# Patient Record
Sex: MALE | Race: OTHER | NOT HISPANIC OR LATINO
[De-identification: names, ages, dates, MRNs, and addresses within clinical notes are randomized per-mention and may not be internally consistent; named-entity substitution may affect disease eponyms.]

---

## 2017-03-20 ENCOUNTER — APPOINTMENT (OUTPATIENT)
Dept: PULMONOLOGY | Facility: CLINIC | Age: 77
End: 2017-03-20

## 2017-03-20 VITALS
BODY MASS INDEX: 44.12 KG/M2 | HEIGHT: 63 IN | HEART RATE: 78 BPM | OXYGEN SATURATION: 84 % | DIASTOLIC BLOOD PRESSURE: 78 MMHG | SYSTOLIC BLOOD PRESSURE: 150 MMHG | RESPIRATION RATE: 18 BRPM | WEIGHT: 249 LBS

## 2017-04-24 ENCOUNTER — APPOINTMENT (OUTPATIENT)
Dept: PULMONOLOGY | Facility: CLINIC | Age: 77
End: 2017-04-24

## 2017-04-24 VITALS
BODY MASS INDEX: 44.65 KG/M2 | DIASTOLIC BLOOD PRESSURE: 72 MMHG | OXYGEN SATURATION: 82 % | HEIGHT: 63 IN | HEART RATE: 70 BPM | SYSTOLIC BLOOD PRESSURE: 148 MMHG | WEIGHT: 252 LBS | RESPIRATION RATE: 16 BRPM

## 2017-04-26 RX ORDER — METHYLPREDNISOLONE 4 MG/1
4 TABLET ORAL
Qty: 21 | Refills: 0 | Status: COMPLETED | COMMUNITY
Start: 2016-12-06

## 2017-07-03 ENCOUNTER — APPOINTMENT (OUTPATIENT)
Dept: PULMONOLOGY | Facility: CLINIC | Age: 77
End: 2017-07-03

## 2017-10-02 ENCOUNTER — APPOINTMENT (OUTPATIENT)
Dept: PULMONOLOGY | Facility: CLINIC | Age: 77
End: 2017-10-02
Payer: MEDICARE

## 2017-10-02 VITALS
DIASTOLIC BLOOD PRESSURE: 72 MMHG | BODY MASS INDEX: 43.23 KG/M2 | OXYGEN SATURATION: 93 % | HEIGHT: 63 IN | RESPIRATION RATE: 16 BRPM | HEART RATE: 64 BPM | SYSTOLIC BLOOD PRESSURE: 128 MMHG | WEIGHT: 244 LBS

## 2017-10-02 PROCEDURE — 94060 EVALUATION OF WHEEZING: CPT

## 2017-10-02 PROCEDURE — 94727 GAS DIL/WSHOT DETER LNG VOL: CPT

## 2017-10-02 PROCEDURE — 99214 OFFICE O/P EST MOD 30 MIN: CPT | Mod: 25

## 2017-10-02 PROCEDURE — 94729 DIFFUSING CAPACITY: CPT

## 2018-01-08 ENCOUNTER — APPOINTMENT (OUTPATIENT)
Dept: PULMONOLOGY | Facility: CLINIC | Age: 78
End: 2018-01-08
Payer: MEDICARE

## 2018-01-08 VITALS
SYSTOLIC BLOOD PRESSURE: 118 MMHG | BODY MASS INDEX: 42.52 KG/M2 | HEART RATE: 68 BPM | HEIGHT: 63 IN | DIASTOLIC BLOOD PRESSURE: 64 MMHG | OXYGEN SATURATION: 92 % | WEIGHT: 240 LBS | RESPIRATION RATE: 16 BRPM

## 2018-01-08 PROCEDURE — 99214 OFFICE O/P EST MOD 30 MIN: CPT

## 2018-02-01 ENCOUNTER — OUTPATIENT (OUTPATIENT)
Dept: OUTPATIENT SERVICES | Facility: HOSPITAL | Age: 78
LOS: 1 days | End: 2018-02-01
Payer: MEDICAID

## 2018-02-01 PROCEDURE — G9001: CPT

## 2018-02-26 ENCOUNTER — INPATIENT (INPATIENT)
Facility: HOSPITAL | Age: 78
LOS: 3 days | Discharge: HOME | DRG: 445 | End: 2018-03-02
Attending: SURGERY | Admitting: SURGERY
Payer: MEDICARE

## 2018-02-26 VITALS
RESPIRATION RATE: 18 BRPM | OXYGEN SATURATION: 89 % | TEMPERATURE: 98 F | DIASTOLIC BLOOD PRESSURE: 77 MMHG | HEART RATE: 70 BPM | SYSTOLIC BLOOD PRESSURE: 179 MMHG

## 2018-02-26 DIAGNOSIS — Z98.890 OTHER SPECIFIED POSTPROCEDURAL STATES: Chronic | ICD-10-CM

## 2018-02-26 DIAGNOSIS — R69 ILLNESS, UNSPECIFIED: ICD-10-CM

## 2018-02-26 DIAGNOSIS — I25.10 ATHEROSCLEROTIC HEART DISEASE OF NATIVE CORONARY ARTERY WITHOUT ANGINA PECTORIS: Chronic | ICD-10-CM

## 2018-02-26 LAB
ALBUMIN SERPL ELPH-MCNC: 3.8 G/DL — SIGNIFICANT CHANGE UP (ref 3.3–5)
ALBUMIN SERPL ELPH-MCNC: 3.8 G/DL — SIGNIFICANT CHANGE UP (ref 3.3–5)
ALP SERPL-CCNC: 80 U/L — SIGNIFICANT CHANGE UP (ref 40–120)
ALP SERPL-CCNC: 86 U/L — SIGNIFICANT CHANGE UP (ref 40–120)
ALT FLD-CCNC: 12 U/L — SIGNIFICANT CHANGE UP (ref 10–45)
ALT FLD-CCNC: 18 U/L — SIGNIFICANT CHANGE UP (ref 10–45)
ANION GAP SERPL CALC-SCNC: 11 MMOL/L — SIGNIFICANT CHANGE UP (ref 5–17)
ANION GAP SERPL CALC-SCNC: 13 MMOL/L — SIGNIFICANT CHANGE UP (ref 5–17)
APTT BLD: 27.8 SEC — SIGNIFICANT CHANGE UP (ref 27.5–37.4)
AST SERPL-CCNC: 14 U/L — SIGNIFICANT CHANGE UP (ref 10–40)
AST SERPL-CCNC: 50 U/L — HIGH (ref 10–40)
BASOPHILS NFR BLD AUTO: 0.3 % — SIGNIFICANT CHANGE UP (ref 0–2)
BILIRUB SERPL-MCNC: 0.3 MG/DL — SIGNIFICANT CHANGE UP (ref 0.2–1.2)
BILIRUB SERPL-MCNC: 0.4 MG/DL — SIGNIFICANT CHANGE UP (ref 0.2–1.2)
BUN SERPL-MCNC: 23 MG/DL — SIGNIFICANT CHANGE UP (ref 7–23)
BUN SERPL-MCNC: 24 MG/DL — HIGH (ref 7–23)
CALCIUM SERPL-MCNC: 8.5 MG/DL — SIGNIFICANT CHANGE UP (ref 8.4–10.5)
CALCIUM SERPL-MCNC: 8.8 MG/DL — SIGNIFICANT CHANGE UP (ref 8.4–10.5)
CHLORIDE SERPL-SCNC: 98 MMOL/L — SIGNIFICANT CHANGE UP (ref 96–108)
CHLORIDE SERPL-SCNC: 99 MMOL/L — SIGNIFICANT CHANGE UP (ref 96–108)
CO2 SERPL-SCNC: 28 MMOL/L — SIGNIFICANT CHANGE UP (ref 22–31)
CO2 SERPL-SCNC: 29 MMOL/L — SIGNIFICANT CHANGE UP (ref 22–31)
CREAT SERPL-MCNC: 0.68 MG/DL — SIGNIFICANT CHANGE UP (ref 0.5–1.3)
CREAT SERPL-MCNC: 0.69 MG/DL — SIGNIFICANT CHANGE UP (ref 0.5–1.3)
EOSINOPHIL NFR BLD AUTO: 0.8 % — SIGNIFICANT CHANGE UP (ref 0–6)
GLUCOSE SERPL-MCNC: 137 MG/DL — HIGH (ref 70–99)
GLUCOSE SERPL-MCNC: 179 MG/DL — HIGH (ref 70–99)
HCT VFR BLD CALC: 50.6 % — HIGH (ref 39–50)
HGB BLD-MCNC: 15.8 G/DL — SIGNIFICANT CHANGE UP (ref 13–17)
INR BLD: 1.02 — SIGNIFICANT CHANGE UP (ref 0.88–1.16)
LACTATE SERPL-SCNC: 1.7 MMOL/L — SIGNIFICANT CHANGE UP (ref 0.5–2)
LIDOCAIN IGE QN: 30 U/L — SIGNIFICANT CHANGE UP (ref 7–60)
LYMPHOCYTES # BLD AUTO: 8.4 % — LOW (ref 13–44)
MAGNESIUM SERPL-MCNC: 2 MG/DL — SIGNIFICANT CHANGE UP (ref 1.6–2.6)
MCHC RBC-ENTMCNC: 28.2 PG — SIGNIFICANT CHANGE UP (ref 27–34)
MCHC RBC-ENTMCNC: 31.2 G/DL — LOW (ref 32–36)
MCV RBC AUTO: 90.2 FL — SIGNIFICANT CHANGE UP (ref 80–100)
MONOCYTES NFR BLD AUTO: 4.8 % — SIGNIFICANT CHANGE UP (ref 2–14)
NEUTROPHILS NFR BLD AUTO: 85.7 % — HIGH (ref 43–77)
PLATELET # BLD AUTO: 293 K/UL — SIGNIFICANT CHANGE UP (ref 150–400)
POTASSIUM SERPL-MCNC: 5.1 MMOL/L — SIGNIFICANT CHANGE UP (ref 3.5–5.3)
POTASSIUM SERPL-MCNC: SIGNIFICANT CHANGE UP MMOL/L (ref 3.5–5.3)
POTASSIUM SERPL-SCNC: 5.1 MMOL/L — SIGNIFICANT CHANGE UP (ref 3.5–5.3)
POTASSIUM SERPL-SCNC: SIGNIFICANT CHANGE UP MMOL/L (ref 3.5–5.3)
PROT SERPL-MCNC: 8 G/DL — SIGNIFICANT CHANGE UP (ref 6–8.3)
PROT SERPL-MCNC: 8.4 G/DL — HIGH (ref 6–8.3)
PROTHROM AB SERPL-ACNC: 11.3 SEC — SIGNIFICANT CHANGE UP (ref 9.8–12.7)
RBC # BLD: 5.61 M/UL — SIGNIFICANT CHANGE UP (ref 4.2–5.8)
RBC # FLD: 15.1 % — SIGNIFICANT CHANGE UP (ref 10.3–16.9)
SODIUM SERPL-SCNC: 139 MMOL/L — SIGNIFICANT CHANGE UP (ref 135–145)
SODIUM SERPL-SCNC: 139 MMOL/L — SIGNIFICANT CHANGE UP (ref 135–145)
WBC # BLD: 14.4 K/UL — HIGH (ref 3.8–10.5)
WBC # FLD AUTO: 14.4 K/UL — HIGH (ref 3.8–10.5)

## 2018-02-26 PROCEDURE — 71045 X-RAY EXAM CHEST 1 VIEW: CPT | Mod: 26

## 2018-02-26 PROCEDURE — 76705 ECHO EXAM OF ABDOMEN: CPT | Mod: 26

## 2018-02-26 PROCEDURE — 99285 EMERGENCY DEPT VISIT HI MDM: CPT

## 2018-02-26 RX ORDER — ATORVASTATIN CALCIUM 80 MG/1
40 TABLET, FILM COATED ORAL AT BEDTIME
Refills: 0 | Status: DISCONTINUED | OUTPATIENT
Start: 2018-02-26 | End: 2018-03-02

## 2018-02-26 RX ORDER — FAMOTIDINE 10 MG/ML
20 INJECTION INTRAVENOUS ONCE
Refills: 0 | Status: COMPLETED | OUTPATIENT
Start: 2018-02-26 | End: 2018-02-26

## 2018-02-26 RX ORDER — HYDROMORPHONE HYDROCHLORIDE 2 MG/ML
0.5 INJECTION INTRAMUSCULAR; INTRAVENOUS; SUBCUTANEOUS EVERY 4 HOURS
Refills: 0 | Status: DISCONTINUED | OUTPATIENT
Start: 2018-02-26 | End: 2018-02-28

## 2018-02-26 RX ORDER — IPRATROPIUM/ALBUTEROL SULFATE 18-103MCG
3 AEROSOL WITH ADAPTER (GRAM) INHALATION EVERY 6 HOURS
Refills: 0 | Status: DISCONTINUED | OUTPATIENT
Start: 2018-02-26 | End: 2018-03-02

## 2018-02-26 RX ORDER — ONDANSETRON 8 MG/1
4 TABLET, FILM COATED ORAL ONCE
Refills: 0 | Status: COMPLETED | OUTPATIENT
Start: 2018-02-26 | End: 2018-02-26

## 2018-02-26 RX ORDER — PIPERACILLIN AND TAZOBACTAM 4; .5 G/20ML; G/20ML
3.38 INJECTION, POWDER, LYOPHILIZED, FOR SOLUTION INTRAVENOUS ONCE
Refills: 0 | Status: COMPLETED | OUTPATIENT
Start: 2018-02-26 | End: 2018-02-26

## 2018-02-26 RX ORDER — PIPERACILLIN AND TAZOBACTAM 4; .5 G/20ML; G/20ML
3.38 INJECTION, POWDER, LYOPHILIZED, FOR SOLUTION INTRAVENOUS EVERY 6 HOURS
Refills: 0 | Status: DISCONTINUED | OUTPATIENT
Start: 2018-02-26 | End: 2018-03-02

## 2018-02-26 RX ORDER — BUDESONIDE AND FORMOTEROL FUMARATE DIHYDRATE 160; 4.5 UG/1; UG/1
2 AEROSOL RESPIRATORY (INHALATION)
Refills: 0 | Status: DISCONTINUED | OUTPATIENT
Start: 2018-02-26 | End: 2018-03-02

## 2018-02-26 RX ORDER — ASPIRIN/CALCIUM CARB/MAGNESIUM 324 MG
81 TABLET ORAL DAILY
Refills: 0 | Status: DISCONTINUED | OUTPATIENT
Start: 2018-02-26 | End: 2018-03-02

## 2018-02-26 RX ORDER — PANTOPRAZOLE SODIUM 20 MG/1
40 TABLET, DELAYED RELEASE ORAL
Refills: 0 | Status: DISCONTINUED | OUTPATIENT
Start: 2018-02-26 | End: 2018-03-02

## 2018-02-26 RX ORDER — SODIUM CHLORIDE 9 MG/ML
1000 INJECTION, SOLUTION INTRAVENOUS
Refills: 0 | Status: DISCONTINUED | OUTPATIENT
Start: 2018-02-26 | End: 2018-02-28

## 2018-02-26 RX ORDER — TAMSULOSIN HYDROCHLORIDE 0.4 MG/1
0.4 CAPSULE ORAL AT BEDTIME
Refills: 0 | Status: DISCONTINUED | OUTPATIENT
Start: 2018-02-26 | End: 2018-03-02

## 2018-02-26 RX ORDER — LOSARTAN POTASSIUM 100 MG/1
100 TABLET, FILM COATED ORAL DAILY
Refills: 0 | Status: DISCONTINUED | OUTPATIENT
Start: 2018-02-26 | End: 2018-03-02

## 2018-02-26 RX ORDER — HEPARIN SODIUM 5000 [USP'U]/ML
7500 INJECTION INTRAVENOUS; SUBCUTANEOUS EVERY 8 HOURS
Refills: 0 | Status: DISCONTINUED | OUTPATIENT
Start: 2018-02-26 | End: 2018-03-02

## 2018-02-26 RX ORDER — SODIUM CHLORIDE 9 MG/ML
1000 INJECTION INTRAMUSCULAR; INTRAVENOUS; SUBCUTANEOUS ONCE
Refills: 0 | Status: COMPLETED | OUTPATIENT
Start: 2018-02-26 | End: 2018-02-26

## 2018-02-26 RX ORDER — KETOROLAC TROMETHAMINE 30 MG/ML
15 SYRINGE (ML) INJECTION ONCE
Refills: 0 | Status: DISCONTINUED | OUTPATIENT
Start: 2018-02-26 | End: 2018-02-26

## 2018-02-26 RX ORDER — HYDROMORPHONE HYDROCHLORIDE 2 MG/ML
1 INJECTION INTRAMUSCULAR; INTRAVENOUS; SUBCUTANEOUS EVERY 4 HOURS
Refills: 0 | Status: DISCONTINUED | OUTPATIENT
Start: 2018-02-26 | End: 2018-02-28

## 2018-02-26 RX ORDER — ONDANSETRON 8 MG/1
4 TABLET, FILM COATED ORAL EVERY 6 HOURS
Refills: 0 | Status: DISCONTINUED | OUTPATIENT
Start: 2018-02-26 | End: 2018-03-02

## 2018-02-26 RX ADMIN — LOSARTAN POTASSIUM 100 MILLIGRAM(S): 100 TABLET, FILM COATED ORAL at 19:11

## 2018-02-26 RX ADMIN — HEPARIN SODIUM 7500 UNIT(S): 5000 INJECTION INTRAVENOUS; SUBCUTANEOUS at 15:08

## 2018-02-26 RX ADMIN — HYDROMORPHONE HYDROCHLORIDE 0.5 MILLIGRAM(S): 2 INJECTION INTRAMUSCULAR; INTRAVENOUS; SUBCUTANEOUS at 23:30

## 2018-02-26 RX ADMIN — PIPERACILLIN AND TAZOBACTAM 200 GRAM(S): 4; .5 INJECTION, POWDER, LYOPHILIZED, FOR SOLUTION INTRAVENOUS at 13:28

## 2018-02-26 RX ADMIN — Medication 15 MILLIGRAM(S): at 05:40

## 2018-02-26 RX ADMIN — PIPERACILLIN AND TAZOBACTAM 200 GRAM(S): 4; .5 INJECTION, POWDER, LYOPHILIZED, FOR SOLUTION INTRAVENOUS at 19:10

## 2018-02-26 RX ADMIN — HYDROMORPHONE HYDROCHLORIDE 0.5 MILLIGRAM(S): 2 INJECTION INTRAMUSCULAR; INTRAVENOUS; SUBCUTANEOUS at 23:04

## 2018-02-26 RX ADMIN — ATORVASTATIN CALCIUM 40 MILLIGRAM(S): 80 TABLET, FILM COATED ORAL at 22:20

## 2018-02-26 RX ADMIN — PIPERACILLIN AND TAZOBACTAM 200 GRAM(S): 4; .5 INJECTION, POWDER, LYOPHILIZED, FOR SOLUTION INTRAVENOUS at 07:11

## 2018-02-26 RX ADMIN — Medication 81 MILLIGRAM(S): at 19:11

## 2018-02-26 RX ADMIN — FAMOTIDINE 20 MILLIGRAM(S): 10 INJECTION INTRAVENOUS at 05:40

## 2018-02-26 RX ADMIN — BUDESONIDE AND FORMOTEROL FUMARATE DIHYDRATE 2 PUFF(S): 160; 4.5 AEROSOL RESPIRATORY (INHALATION) at 19:11

## 2018-02-26 RX ADMIN — TAMSULOSIN HYDROCHLORIDE 0.4 MILLIGRAM(S): 0.4 CAPSULE ORAL at 22:19

## 2018-02-26 RX ADMIN — Medication 3 MILLILITER(S): at 23:04

## 2018-02-26 RX ADMIN — HEPARIN SODIUM 7500 UNIT(S): 5000 INJECTION INTRAVENOUS; SUBCUTANEOUS at 22:20

## 2018-02-26 RX ADMIN — Medication 15 MILLIGRAM(S): at 06:56

## 2018-02-26 RX ADMIN — SODIUM CHLORIDE 150 MILLILITER(S): 9 INJECTION, SOLUTION INTRAVENOUS at 22:19

## 2018-02-26 RX ADMIN — ONDANSETRON 4 MILLIGRAM(S): 8 TABLET, FILM COATED ORAL at 05:40

## 2018-02-26 RX ADMIN — SODIUM CHLORIDE 2000 MILLILITER(S): 9 INJECTION INTRAMUSCULAR; INTRAVENOUS; SUBCUTANEOUS at 05:40

## 2018-02-26 NOTE — PATIENT PROFILE ADULT. - VISION (WITH CORRECTIVE LENSES IF THE PATIENT USUALLY WEARS THEM):
weasr glasses/Partially impaired: cannot see medication labels or newsprint, but can see obstacles in path, and the surrounding layout; can count fingers at arm's length

## 2018-02-26 NOTE — H&P ADULT - HISTORY OF PRESENT ILLNESS
This is a 76 yo M with COPD (on 2L oxygen), SOURAV (on CPAP), HTN, CAD (s/p 3 stents and balloon angioplasty), BPH, p/w RUQ pain for 10 hours. The pain was described as 7/10 at RUQ and radiated to the back. +nausea, no vomiting, no chills/fever, not associated with meals. No SOB/chest pain or dizziness.     Never have colonoscopy before  Last meal was yesterday dinner  last plavix yesterday morning

## 2018-02-26 NOTE — ED PROVIDER NOTE - PHYSICAL EXAMINATION
VITAL SIGNS: I have reviewed nursing notes and confirm.  CONSTITUTIONAL: Well-developed; well-nourished obese male sitting in stretcher, comfortable and talkative, A&Ox3; in no acute distress.  SKIN: Agree with RN documentation regarding decubitus evaluation. Remainder of skin exam is warm and dry, no acute rash.  HEAD: Normocephalic; atraumatic.  EYES: PERRL, EOM intact; conjunctiva and sclera clear.  ENT: No nasal discharge; airway clear.  NECK: Supple; non tender.  CARD: S1, S2 normal; no murmurs, gallops, or rubs. RRR  RESP: + end exp wheeze all lung fields w/moderate excursion, no tachypnea or increased work of breathing  ABD: Normal bowel sounds; soft; obese though ND, + mild RUQ TTP w/out rigidity/guarding, no mcburney's pt ttp  EXT: Normal ROM. No clubbing, cyanosis or edema.  LYMPH: No acute cervical adenopathy.  NEURO: Alert, oriented. Grossly unremarkable.  PSYCH: Cooperative, appropriate.

## 2018-02-26 NOTE — H&P ADULT - NSHPPHYSICALEXAM_GEN_ALL_CORE
General: NAD, resting comfortably in bed  C/V: NSR  Pulm: Nonlabored breathing, no respiratory distress  Abd: soft, Non tender, mir negative, distended.  Extrem: WWP, no edema, SCDs in place      Vital Signs Last 24 Hrs  T(C): 36.4 (26 Feb 2018 06:02), Max: 36.4 (26 Feb 2018 05:18)  T(F): 97.5 (26 Feb 2018 06:02), Max: 97.6 (26 Feb 2018 05:18)  HR: 65 (26 Feb 2018 07:38) (62 - 70)  BP: 143/67 (26 Feb 2018 07:38) (143/67 - 179/77)  BP(mean): --  RR: 18 (26 Feb 2018 07:38) (18 - 18)  SpO2: 92% (26 Feb 2018 07:38) (89% - 92%)  I&O's Detail

## 2018-02-26 NOTE — ED PROVIDER NOTE - OBJECTIVE STATEMENT
76 y/o M w/CAD w/several stents s/p CABG on ASA/anti-platlet, HTN, HLD, COPD w/PRN O2 requirement, SOURAV on nightly CPAP, obesity, nephrolithiasis w/7x lithotripsy in the past, with known gallstones, following with Dr. Davis for biliary colic p/w acute onset RUQ abd pain which woke him from sleep tonight radiating around the right side to the back with nausea, no vomiting. No SOB/CP/palpitations. Passing gas, normal BM yesterday. No urinary sx. No fever/chills.

## 2018-02-26 NOTE — ED PROVIDER NOTE - PROGRESS NOTE DETAILS
Pt received from Dr. Salazar at s/o; pt with multiple co-morbidities, known gallstones, here w/ ruq pain, nausea, typical of his biliary colic. WBC mildly elev to 14, lactate, lft's, and lipase wnl. Pt given zosyn, and is awaiting ruq sono. Surgery aware and will evaluate. Pt comfortable appearing, will continue to monitor. RUQ neg for cholecystitis. Pt seen by surgery- to be admitted to Dr. [facility] [place], for monitoring and serial abd exams.

## 2018-02-26 NOTE — ED ADULT NURSE NOTE - OBJECTIVE STATEMENT
pain starts at 2330H, 25-Feb-2018. pain starts at 2330H, 25-Feb-2018.  pt is taking O2 and CPAP at home whenever necessary due to COPD    s/p cardiac stent

## 2018-02-26 NOTE — H&P ADULT - ASSESSMENT
76 yo M with COPD on home O2, CAD s/p 3 stents, BPH, HTN p/w RUQ pain, benign physical exam, leukocytosis of 14, US neg for acute cholecystitis, +cholelithiasis with hepatic statosis    Plan  Admit to regional under , team 4  NPO/IVF LR@150cc/hr  IV Zosyn 3.375mg Q8  Pain and nausea control  DVT prophylaxis  Not for surgical intervention yet  AM labs - CMP    plan d/w chief, pending with Attending

## 2018-02-26 NOTE — ED ADULT NURSE REASSESSMENT NOTE - NS ED NURSE REASSESS COMMENT FT1
received report from Josemanuel NAYLOR from nights, received pt awake, alert and oriented x 3.  pt on 2 liters nasal cannula respirations easy,unlabored.   Transport at bedside to take pt to Ultrasound.  Will continue to monitor.
chem lab informed that sample is hemolysed for potassium therefore draw another specimen and sent.

## 2018-02-26 NOTE — ED ADULT NURSE NOTE - PMH
Arthritis    BPH (benign prostatic hyperplasia)    CAD (coronary artery disease)    Cholelithiasis    COPD (chronic obstructive pulmonary disease)    HLD (hyperlipidemia)

## 2018-02-26 NOTE — ED PROVIDER NOTE - MEDICAL DECISION MAKING DETAILS
Likely biliary colic vs cholecystitis vs pancreatitis vs GERD/gastritis/PUD -- pending RUQ u/s, abdominal labs, ekg, sx management and reassessment. Will speak with surgery to ran as pt directed to report to ED by Susan should RUQ pain return.

## 2018-02-27 DIAGNOSIS — E78.00 PURE HYPERCHOLESTEROLEMIA, UNSPECIFIED: ICD-10-CM

## 2018-02-27 DIAGNOSIS — R09.02 HYPOXEMIA: ICD-10-CM

## 2018-02-27 DIAGNOSIS — I25.10 ATHEROSCLEROTIC HEART DISEASE OF NATIVE CORONARY ARTERY WITHOUT ANGINA PECTORIS: ICD-10-CM

## 2018-02-27 DIAGNOSIS — J44.9 CHRONIC OBSTRUCTIVE PULMONARY DISEASE, UNSPECIFIED: ICD-10-CM

## 2018-02-27 DIAGNOSIS — G47.33 OBSTRUCTIVE SLEEP APNEA (ADULT) (PEDIATRIC): ICD-10-CM

## 2018-02-27 LAB
ALBUMIN SERPL ELPH-MCNC: 3.6 G/DL — SIGNIFICANT CHANGE UP (ref 3.3–5)
ALP SERPL-CCNC: 75 U/L — SIGNIFICANT CHANGE UP (ref 40–120)
ALT FLD-CCNC: 34 U/L — SIGNIFICANT CHANGE UP (ref 10–45)
ANION GAP SERPL CALC-SCNC: 9 MMOL/L — SIGNIFICANT CHANGE UP (ref 5–17)
AST SERPL-CCNC: 29 U/L — SIGNIFICANT CHANGE UP (ref 10–40)
BILIRUB SERPL-MCNC: 0.7 MG/DL — SIGNIFICANT CHANGE UP (ref 0.2–1.2)
BUN SERPL-MCNC: 18 MG/DL — SIGNIFICANT CHANGE UP (ref 7–23)
CALCIUM SERPL-MCNC: 8.7 MG/DL — SIGNIFICANT CHANGE UP (ref 8.4–10.5)
CHLORIDE SERPL-SCNC: 99 MMOL/L — SIGNIFICANT CHANGE UP (ref 96–108)
CO2 SERPL-SCNC: 31 MMOL/L — SIGNIFICANT CHANGE UP (ref 22–31)
CREAT SERPL-MCNC: 0.66 MG/DL — SIGNIFICANT CHANGE UP (ref 0.5–1.3)
GLUCOSE SERPL-MCNC: 137 MG/DL — HIGH (ref 70–99)
GRAM STN FLD: SIGNIFICANT CHANGE UP
HCT VFR BLD CALC: 48.5 % — SIGNIFICANT CHANGE UP (ref 39–50)
HGB BLD-MCNC: 14.9 G/DL — SIGNIFICANT CHANGE UP (ref 13–17)
LIDOCAIN IGE QN: 28 U/L — SIGNIFICANT CHANGE UP (ref 7–60)
MAGNESIUM SERPL-MCNC: 1.9 MG/DL — SIGNIFICANT CHANGE UP (ref 1.6–2.6)
MCHC RBC-ENTMCNC: 28.1 PG — SIGNIFICANT CHANGE UP (ref 27–34)
MCHC RBC-ENTMCNC: 30.7 G/DL — LOW (ref 32–36)
MCV RBC AUTO: 91.5 FL — SIGNIFICANT CHANGE UP (ref 80–100)
PHOSPHATE SERPL-MCNC: 3 MG/DL — SIGNIFICANT CHANGE UP (ref 2.5–4.5)
PLATELET # BLD AUTO: 256 K/UL — SIGNIFICANT CHANGE UP (ref 150–400)
POTASSIUM SERPL-MCNC: 4.1 MMOL/L — SIGNIFICANT CHANGE UP (ref 3.5–5.3)
POTASSIUM SERPL-SCNC: 4.1 MMOL/L — SIGNIFICANT CHANGE UP (ref 3.5–5.3)
PROT SERPL-MCNC: 7.2 G/DL — SIGNIFICANT CHANGE UP (ref 6–8.3)
RBC # BLD: 5.3 M/UL — SIGNIFICANT CHANGE UP (ref 4.2–5.8)
RBC # FLD: 14.9 % — SIGNIFICANT CHANGE UP (ref 10.3–16.9)
SODIUM SERPL-SCNC: 139 MMOL/L — SIGNIFICANT CHANGE UP (ref 135–145)
SPECIMEN SOURCE: SIGNIFICANT CHANGE UP
WBC # BLD: 14.7 K/UL — HIGH (ref 3.8–10.5)
WBC # FLD AUTO: 14.7 K/UL — HIGH (ref 3.8–10.5)

## 2018-02-27 PROCEDURE — 99152 MOD SED SAME PHYS/QHP 5/>YRS: CPT

## 2018-02-27 PROCEDURE — 99223 1ST HOSP IP/OBS HIGH 75: CPT | Mod: GC

## 2018-02-27 PROCEDURE — 47490 INCISION OF GALLBLADDER: CPT

## 2018-02-27 PROCEDURE — 78226 HEPATOBILIARY SYSTEM IMAGING: CPT | Mod: 26

## 2018-02-27 PROCEDURE — 99222 1ST HOSP IP/OBS MODERATE 55: CPT

## 2018-02-27 RX ADMIN — PANTOPRAZOLE SODIUM 40 MILLIGRAM(S): 20 TABLET, DELAYED RELEASE ORAL at 07:17

## 2018-02-27 RX ADMIN — Medication 81 MILLIGRAM(S): at 11:58

## 2018-02-27 RX ADMIN — HEPARIN SODIUM 7500 UNIT(S): 5000 INJECTION INTRAVENOUS; SUBCUTANEOUS at 13:00

## 2018-02-27 RX ADMIN — PIPERACILLIN AND TAZOBACTAM 200 GRAM(S): 4; .5 INJECTION, POWDER, LYOPHILIZED, FOR SOLUTION INTRAVENOUS at 07:17

## 2018-02-27 RX ADMIN — SODIUM CHLORIDE 150 MILLILITER(S): 9 INJECTION, SOLUTION INTRAVENOUS at 23:30

## 2018-02-27 RX ADMIN — HEPARIN SODIUM 7500 UNIT(S): 5000 INJECTION INTRAVENOUS; SUBCUTANEOUS at 05:31

## 2018-02-27 RX ADMIN — PIPERACILLIN AND TAZOBACTAM 200 GRAM(S): 4; .5 INJECTION, POWDER, LYOPHILIZED, FOR SOLUTION INTRAVENOUS at 18:36

## 2018-02-27 RX ADMIN — PIPERACILLIN AND TAZOBACTAM 200 GRAM(S): 4; .5 INJECTION, POWDER, LYOPHILIZED, FOR SOLUTION INTRAVENOUS at 01:00

## 2018-02-27 RX ADMIN — PIPERACILLIN AND TAZOBACTAM 200 GRAM(S): 4; .5 INJECTION, POWDER, LYOPHILIZED, FOR SOLUTION INTRAVENOUS at 23:53

## 2018-02-27 RX ADMIN — LOSARTAN POTASSIUM 100 MILLIGRAM(S): 100 TABLET, FILM COATED ORAL at 05:30

## 2018-02-27 RX ADMIN — Medication 3 MILLILITER(S): at 23:54

## 2018-02-27 RX ADMIN — Medication 3 MILLILITER(S): at 11:58

## 2018-02-27 RX ADMIN — PIPERACILLIN AND TAZOBACTAM 200 GRAM(S): 4; .5 INJECTION, POWDER, LYOPHILIZED, FOR SOLUTION INTRAVENOUS at 12:25

## 2018-02-27 RX ADMIN — BUDESONIDE AND FORMOTEROL FUMARATE DIHYDRATE 2 PUFF(S): 160; 4.5 AEROSOL RESPIRATORY (INHALATION) at 18:38

## 2018-02-27 RX ADMIN — BUDESONIDE AND FORMOTEROL FUMARATE DIHYDRATE 2 PUFF(S): 160; 4.5 AEROSOL RESPIRATORY (INHALATION) at 05:31

## 2018-02-27 RX ADMIN — Medication 3 MILLILITER(S): at 05:42

## 2018-02-27 RX ADMIN — Medication 3 MILLILITER(S): at 18:38

## 2018-02-27 RX ADMIN — HYDROMORPHONE HYDROCHLORIDE 0.5 MILLIGRAM(S): 2 INJECTION INTRAMUSCULAR; INTRAVENOUS; SUBCUTANEOUS at 23:30

## 2018-02-27 RX ADMIN — HYDROMORPHONE HYDROCHLORIDE 0.5 MILLIGRAM(S): 2 INJECTION INTRAMUSCULAR; INTRAVENOUS; SUBCUTANEOUS at 23:59

## 2018-02-27 RX ADMIN — HEPARIN SODIUM 7500 UNIT(S): 5000 INJECTION INTRAVENOUS; SUBCUTANEOUS at 21:19

## 2018-02-27 NOTE — CONSULT NOTE ADULT - PROBLEM SELECTOR PROBLEM 1
Coronary artery disease involving native coronary artery of native heart without angina pectoris
Chronic obstructive pulmonary disease, unspecified COPD type

## 2018-02-27 NOTE — CONSULT NOTE ADULT - PROBLEM SELECTOR RECOMMENDATION 2
I informed him the need to be compliant with the cpap and full face mask.  He dose not remember the pressure setting he is on at home.  He is on cpap 10 and will observe.  Avoid over sedation.  Transfer the patient to monitor bed.
wheezing, needs bronchodilators and steroid inhalers

## 2018-02-27 NOTE — CONSULT NOTE ADULT - ATTENDING COMMENTS
Patient seen and examined with house-staff during bedside rounds.  Resident note read, including vitals, physical findings, laboratory data, and radiological reports.   Revisions included below.  Direct personal management at bed side and extensive interpretation of the data.  Plan was outlined and discussed in details with the housestaff.  Decision making of high complexity  I discussed with Dr Davis and family.  The resident is aware with the need to transfer the patient to monitor bed

## 2018-02-27 NOTE — CONSULT NOTE ADULT - PROBLEM SELECTOR RECOMMENDATION 9
the patient is unable to use the Symbicort.  He is to continue the nebulizer treatment and will follow on PIF in am.  He is follow by pulmonary and is on Spiriva and symbicort.  He may benefit from respimat
Noe sents, continue ASA and statin

## 2018-02-27 NOTE — CONSULT NOTE ADULT - PROBLEM SELECTOR RECOMMENDATION 3
continue statn
related to copd /karley/ and atelectasis from splinting from abdominal pain.  He is on home oxygen.  Continue oxygen supplementation and accept sat 88%.  Incease activity

## 2018-02-27 NOTE — PROGRESS NOTE ADULT - SUBJECTIVE AND OBJECTIVE BOX
ON: Complaints of back pain.  2/26: IV zosyn, no Sx intervention             76 yo M with COPD (on 2L oxygen), SOURAV (on CPAP), HTN, CAD (s/p 3 stents and balloon angioplasty), BPH, p/w RUQ pain for 10 hours    - Pain/ Nausea control PRN  - home meds  - IVF  - CLD  - Zosyn  - sqh, scd, oob ON: Complaints of back pain.  2/26: IV zosyn, no Sx intervention       SUBJECTIVE: Denies any complaints  Flatus: [x ] YES [ ] NO             Bowel Movement: [x ] YES [ ] NO  Pain (0-10):            Pain Control Adequate: [x ] YES [ ] NO  Nausea: [ ] YES [x ] NO            Vomiting: [ ] YES [x ] NO  Diarrhea: [ ] YES [x ] NO         Constipation: [ ] YES [x ] NO     Chest Pain: [ ] YES [x ] NO    SOB:  [ ] YES [x ] NO    MEDICATIONS  (STANDING):  ALBUTerol/ipratropium for Nebulization 3 milliLiter(s) Nebulizer every 6 hours  aspirin  chewable 81 milliGRAM(s) Oral daily  atorvastatin 40 milliGRAM(s) Oral at bedtime  buDESOnide 160 MICROgram(s)/formoterol 4.5 MICROgram(s) Inhaler 2 Puff(s) Inhalation two times a day  heparin  Injectable 7500 Unit(s) SubCutaneous every 8 hours  lactated ringers. 1000 milliLiter(s) (150 mL/Hr) IV Continuous <Continuous>  losartan 100 milliGRAM(s) Oral daily  pantoprazole    Tablet 40 milliGRAM(s) Oral before breakfast  piperacillin/tazobactam IVPB. 3.375 Gram(s) IV Intermittent every 6 hours  tamsulosin 0.4 milliGRAM(s) Oral at bedtime    MEDICATIONS  (PRN):  HYDROmorphone  Injectable 0.5 milliGRAM(s) IV Push every 4 hours PRN Moderate Pain  HYDROmorphone  Injectable 1 milliGRAM(s) IV Push every 4 hours PRN Severe Pain  ondansetron Injectable 4 milliGRAM(s) IV Push every 6 hours PRN Nausea      Vital Signs Last 24 Hrs  T(C): 35.8 (27 Feb 2018 05:46), Max: 36.9 (26 Feb 2018 11:08)  T(F): 96.4 (27 Feb 2018 05:46), Max: 98.5 (26 Feb 2018 11:08)  HR: 74 (27 Feb 2018 05:46) (60 - 75)  BP: 167/74 (27 Feb 2018 05:46) (139/74 - 167/74)  BP(mean): --  RR: 18 (27 Feb 2018 06:35) (17 - 24)  SpO2: 92% (27 Feb 2018 06:35) (82% - 98%)    PHYSICAL EXAM:      Constitutional: A&Ox3    Respiratory: non labored breathing, no respiratory distress    Cardiovascular: NSR, RRR    Gastrointestinal: Soft ND, tender to palp in RUQ             Genitourinary: VOIDING    Extremities: (-) edema                  I&O's Detail    26 Feb 2018 07:01  -  27 Feb 2018 07:00  --------------------------------------------------------  IN:    IV PiggyBack: 400 mL    lactated ringers.: 2600 mL    Oral Fluid: 320 mL    Sodium Chloride 0.9% IV Bolus: 1000 mL  Total IN: 4320 mL    OUT:    Voided: 400 mL  Total OUT: 400 mL    Total NET: 3920 mL          LABS:                        15.8   14.4  )-----------( 293      ( 26 Feb 2018 06:07 )             50.6     02-26    139  |  99  |  23  ----------------------------<  137<H>  5.1   |  29  |  0.69    Ca    8.5      26 Feb 2018 08:15  Mg     2.0     02-26    TPro  8.0  /  Alb  3.8  /  TBili  0.3  /  DBili  x   /  AST  14  /  ALT  12  /  AlkPhos  86  02-26    PT/INR - ( 26 Feb 2018 06:07 )   PT: 11.3 sec;   INR: 1.02          PTT - ( 26 Feb 2018 06:07 )  PTT:27.8 sec      RADIOLOGY & ADDITIONAL STUDIES:

## 2018-02-28 DIAGNOSIS — I10 ESSENTIAL (PRIMARY) HYPERTENSION: ICD-10-CM

## 2018-02-28 LAB
ALBUMIN SERPL ELPH-MCNC: 3.4 G/DL — SIGNIFICANT CHANGE UP (ref 3.3–5)
ALP SERPL-CCNC: 71 U/L — SIGNIFICANT CHANGE UP (ref 40–120)
ALT FLD-CCNC: 37 U/L — SIGNIFICANT CHANGE UP (ref 10–45)
ANION GAP SERPL CALC-SCNC: 7 MMOL/L — SIGNIFICANT CHANGE UP (ref 5–17)
AST SERPL-CCNC: 24 U/L — SIGNIFICANT CHANGE UP (ref 10–40)
BILIRUB SERPL-MCNC: 0.9 MG/DL — SIGNIFICANT CHANGE UP (ref 0.2–1.2)
BUN SERPL-MCNC: 16 MG/DL — SIGNIFICANT CHANGE UP (ref 7–23)
CALCIUM SERPL-MCNC: 9 MG/DL — SIGNIFICANT CHANGE UP (ref 8.4–10.5)
CHLORIDE SERPL-SCNC: 100 MMOL/L — SIGNIFICANT CHANGE UP (ref 96–108)
CO2 SERPL-SCNC: 35 MMOL/L — HIGH (ref 22–31)
CREAT SERPL-MCNC: 0.72 MG/DL — SIGNIFICANT CHANGE UP (ref 0.5–1.3)
GLUCOSE SERPL-MCNC: 114 MG/DL — HIGH (ref 70–99)
HCT VFR BLD CALC: 47 % — SIGNIFICANT CHANGE UP (ref 39–50)
HGB BLD-MCNC: 14.2 G/DL — SIGNIFICANT CHANGE UP (ref 13–17)
MAGNESIUM SERPL-MCNC: 1.9 MG/DL — SIGNIFICANT CHANGE UP (ref 1.6–2.6)
MCHC RBC-ENTMCNC: 28.6 PG — SIGNIFICANT CHANGE UP (ref 27–34)
MCHC RBC-ENTMCNC: 30.2 G/DL — LOW (ref 32–36)
MCV RBC AUTO: 94.6 FL — SIGNIFICANT CHANGE UP (ref 80–100)
NT-PROBNP SERPL-SCNC: 407 PG/ML — HIGH (ref 0–300)
PHOSPHATE SERPL-MCNC: 2.4 MG/DL — LOW (ref 2.5–4.5)
PLATELET # BLD AUTO: 254 K/UL — SIGNIFICANT CHANGE UP (ref 150–400)
POTASSIUM SERPL-MCNC: 4 MMOL/L — SIGNIFICANT CHANGE UP (ref 3.5–5.3)
POTASSIUM SERPL-SCNC: 4 MMOL/L — SIGNIFICANT CHANGE UP (ref 3.5–5.3)
PROT SERPL-MCNC: 6.9 G/DL — SIGNIFICANT CHANGE UP (ref 6–8.3)
RBC # BLD: 4.97 M/UL — SIGNIFICANT CHANGE UP (ref 4.2–5.8)
RBC # FLD: 15 % — SIGNIFICANT CHANGE UP (ref 10.3–16.9)
SODIUM SERPL-SCNC: 142 MMOL/L — SIGNIFICANT CHANGE UP (ref 135–145)
WBC # BLD: 12 K/UL — HIGH (ref 3.8–10.5)
WBC # FLD AUTO: 12 K/UL — HIGH (ref 3.8–10.5)

## 2018-02-28 PROCEDURE — 71045 X-RAY EXAM CHEST 1 VIEW: CPT | Mod: 26

## 2018-02-28 PROCEDURE — 99233 SBSQ HOSP IP/OBS HIGH 50: CPT | Mod: GC

## 2018-02-28 PROCEDURE — 99232 SBSQ HOSP IP/OBS MODERATE 35: CPT

## 2018-02-28 RX ORDER — SODIUM,POTASSIUM PHOSPHATES 278-250MG
2 POWDER IN PACKET (EA) ORAL ONCE
Refills: 0 | Status: COMPLETED | OUTPATIENT
Start: 2018-02-28 | End: 2018-02-28

## 2018-02-28 RX ORDER — ACETAMINOPHEN 500 MG
975 TABLET ORAL EVERY 6 HOURS
Refills: 0 | Status: DISCONTINUED | OUTPATIENT
Start: 2018-02-28 | End: 2018-03-02

## 2018-02-28 RX ORDER — LABETALOL HCL 100 MG
10 TABLET ORAL ONCE
Refills: 0 | Status: COMPLETED | OUTPATIENT
Start: 2018-02-28 | End: 2018-02-28

## 2018-02-28 RX ORDER — FUROSEMIDE 40 MG
20 TABLET ORAL ONCE
Refills: 0 | Status: COMPLETED | OUTPATIENT
Start: 2018-02-28 | End: 2018-02-28

## 2018-02-28 RX ORDER — ACETAMINOPHEN 500 MG
650 TABLET ORAL EVERY 6 HOURS
Refills: 0 | Status: DISCONTINUED | OUTPATIENT
Start: 2018-02-28 | End: 2018-03-02

## 2018-02-28 RX ORDER — IPRATROPIUM/ALBUTEROL SULFATE 18-103MCG
3 AEROSOL WITH ADAPTER (GRAM) INHALATION ONCE
Refills: 0 | Status: COMPLETED | OUTPATIENT
Start: 2018-02-28 | End: 2018-02-28

## 2018-02-28 RX ADMIN — Medication 3 MILLILITER(S): at 14:48

## 2018-02-28 RX ADMIN — Medication 2 TABLET(S): at 08:56

## 2018-02-28 RX ADMIN — Medication 3 MILLILITER(S): at 17:42

## 2018-02-28 RX ADMIN — PIPERACILLIN AND TAZOBACTAM 200 GRAM(S): 4; .5 INJECTION, POWDER, LYOPHILIZED, FOR SOLUTION INTRAVENOUS at 11:51

## 2018-02-28 RX ADMIN — HEPARIN SODIUM 7500 UNIT(S): 5000 INJECTION INTRAVENOUS; SUBCUTANEOUS at 15:21

## 2018-02-28 RX ADMIN — PIPERACILLIN AND TAZOBACTAM 200 GRAM(S): 4; .5 INJECTION, POWDER, LYOPHILIZED, FOR SOLUTION INTRAVENOUS at 05:05

## 2018-02-28 RX ADMIN — HEPARIN SODIUM 7500 UNIT(S): 5000 INJECTION INTRAVENOUS; SUBCUTANEOUS at 05:04

## 2018-02-28 RX ADMIN — Medication 3 MILLILITER(S): at 11:51

## 2018-02-28 RX ADMIN — SODIUM CHLORIDE 150 MILLILITER(S): 9 INJECTION, SOLUTION INTRAVENOUS at 05:13

## 2018-02-28 RX ADMIN — ONDANSETRON 4 MILLIGRAM(S): 8 TABLET, FILM COATED ORAL at 09:14

## 2018-02-28 RX ADMIN — PIPERACILLIN AND TAZOBACTAM 200 GRAM(S): 4; .5 INJECTION, POWDER, LYOPHILIZED, FOR SOLUTION INTRAVENOUS at 23:40

## 2018-02-28 RX ADMIN — Medication 20 MILLIGRAM(S): at 11:43

## 2018-02-28 RX ADMIN — Medication 3 MILLILITER(S): at 05:04

## 2018-02-28 RX ADMIN — BUDESONIDE AND FORMOTEROL FUMARATE DIHYDRATE 2 PUFF(S): 160; 4.5 AEROSOL RESPIRATORY (INHALATION) at 07:36

## 2018-02-28 RX ADMIN — Medication 3 MILLILITER(S): at 23:40

## 2018-02-28 RX ADMIN — PIPERACILLIN AND TAZOBACTAM 200 GRAM(S): 4; .5 INJECTION, POWDER, LYOPHILIZED, FOR SOLUTION INTRAVENOUS at 17:42

## 2018-02-28 RX ADMIN — TAMSULOSIN HYDROCHLORIDE 0.4 MILLIGRAM(S): 0.4 CAPSULE ORAL at 21:26

## 2018-02-28 RX ADMIN — HYDROMORPHONE HYDROCHLORIDE 1 MILLIGRAM(S): 2 INJECTION INTRAMUSCULAR; INTRAVENOUS; SUBCUTANEOUS at 08:52

## 2018-02-28 RX ADMIN — HEPARIN SODIUM 7500 UNIT(S): 5000 INJECTION INTRAVENOUS; SUBCUTANEOUS at 21:26

## 2018-02-28 RX ADMIN — ATORVASTATIN CALCIUM 40 MILLIGRAM(S): 80 TABLET, FILM COATED ORAL at 21:26

## 2018-02-28 RX ADMIN — Medication 10 MILLIGRAM(S): at 11:43

## 2018-02-28 RX ADMIN — Medication 81 MILLIGRAM(S): at 15:21

## 2018-02-28 RX ADMIN — PANTOPRAZOLE SODIUM 40 MILLIGRAM(S): 20 TABLET, DELAYED RELEASE ORAL at 08:56

## 2018-02-28 NOTE — PROGRESS NOTE ADULT - SUBJECTIVE AND OBJECTIVE BOX
ON: s/p perc jinny, 40cc bilious fluid. Gram stain negative  2/27: hida scan done pending result. awaiting IR for perc jinny        78 yo M with COPD (on 2L oxygen), SOURAV (on CPAP), HTN, CAD (s/p 3 stents and balloon angioplasty), BPH, p/w RUQ pain for 10 hours    - Pain/ Nausea control PRN  - home meds  - IVF  - CLD  - Zosyn  - sqh, scd, oob ON: s/p perc jinny, 40cc bilious fluid. Gram stain negative  2/27: hida scan done pending result. awaiting IR for perc jinny      SUBJECTIVE: on CPAP o/n  Flatus: [ ] YES [ ] NO             Bowel Movement: [ ] YES [ ] NO  Pain (0-10):            Pain Control Adequate: [x ] YES [ ] NO  Nausea: [ ] YES [x ] NO            Vomiting: [ ] YES [x ] NO  Diarrhea: [ ] YES [ x] NO         Constipation: [ ] YES [ x] NO     Chest Pain: [ ] YES [ x] NO    SOB:  [ ] YES [x ] NO    MEDICATIONS  (STANDING):  ALBUTerol/ipratropium for Nebulization 3 milliLiter(s) Nebulizer every 6 hours  aspirin  chewable 81 milliGRAM(s) Oral daily  atorvastatin 40 milliGRAM(s) Oral at bedtime  buDESOnide 160 MICROgram(s)/formoterol 4.5 MICROgram(s) Inhaler 2 Puff(s) Inhalation two times a day  heparin  Injectable 7500 Unit(s) SubCutaneous every 8 hours  lactated ringers. 1000 milliLiter(s) (150 mL/Hr) IV Continuous <Continuous>  losartan 100 milliGRAM(s) Oral daily  pantoprazole    Tablet 40 milliGRAM(s) Oral before breakfast  piperacillin/tazobactam IVPB. 3.375 Gram(s) IV Intermittent every 6 hours  tamsulosin 0.4 milliGRAM(s) Oral at bedtime    MEDICATIONS  (PRN):  HYDROmorphone  Injectable 0.5 milliGRAM(s) IV Push every 4 hours PRN Moderate Pain  HYDROmorphone  Injectable 1 milliGRAM(s) IV Push every 4 hours PRN Severe Pain  ondansetron Injectable 4 milliGRAM(s) IV Push every 6 hours PRN Nausea      Vital Signs Last 24 Hrs  T(C): 36.8 (28 Feb 2018 05:38), Max: 37.5 (28 Feb 2018 00:18)  T(F): 98.2 (28 Feb 2018 05:38), Max: 99.5 (28 Feb 2018 00:18)  HR: 64 (28 Feb 2018 05:38) (64 - 80)  BP: 135/75 (28 Feb 2018 05:38) (124/73 - 138/74)  BP(mean): --  RR: 20 (28 Feb 2018 05:38) (17 - 25)  SpO2: 94% (28 Feb 2018 05:38) (91% - 94%)    PHYSICAL EXAM:      Constitutional: A&Ox3      Respiratory: non labored breathing, no respiratory distress    Cardiovascular: NSR, RRR    Gastrointestinal: Soft ND,NT                 Incision: Perc jinny intact    Genitourinary: voiding    Extremities: (-) edema                  I&O's Detail    27 Feb 2018 07:01  -  28 Feb 2018 07:00  --------------------------------------------------------  IN:    IV PiggyBack: 300 mL    lactated ringers.: 1950 mL    Oral Fluid: 600 mL  Total IN: 2850 mL    OUT:    Drain: 130 mL    Voided: 1220 mL  Total OUT: 1350 mL    Total NET: 1500 mL          LABS:                        14.2   12.0  )-----------( 254      ( 28 Feb 2018 07:15 )             47.0     02-27    139  |  99  |  18  ----------------------------<  137<H>  4.1   |  31  |  0.66    Ca    8.7      27 Feb 2018 07:05  Phos  3.0     02-27  Mg     1.9     02-27    TPro  7.2  /  Alb  3.6  /  TBili  0.7  /  DBili  x   /  AST  29  /  ALT  34  /  AlkPhos  75  02-27          RADIOLOGY & ADDITIONAL STUDIES:

## 2018-02-28 NOTE — PHYSICAL THERAPY INITIAL EVALUATION ADULT - PHYSICAL ASSIST/NONPHYSICAL ASSIST: SUPINE/SIT, REHAB EVAL
able to bring B/L LEs to edge of bed with VCs, increased assist for trunk mobility; **Dangled EOB x 5 minutes with +emesis, RN Fritz made aware, RN informed Team 4/verbal cues/1 person assist

## 2018-02-28 NOTE — PHYSICAL THERAPY INITIAL EVALUATION ADULT - ADDITIONAL COMMENTS
Patient reports independence with all ADLs/IADLs prior to admission. No HHA. Denies history of mechanical falls.

## 2018-02-28 NOTE — PROGRESS NOTE ADULT - SUBJECTIVE AND OBJECTIVE BOX
INTERVAL HISTORY:  Wearing CPAP  Still with RUQ pain and tenderness    MEDICATIONS:  losartan 100 milliGRAM(s) Oral daily  tamsulosin 0.4 milliGRAM(s) Oral at bedtime    piperacillin/tazobactam IVPB. 3.375 Gram(s) IV Intermittent every 6 hours    ALBUTerol/ipratropium for Nebulization 3 milliLiter(s) Nebulizer every 6 hours  buDESOnide 160 MICROgram(s)/formoterol 4.5 MICROgram(s) Inhaler 2 Puff(s) Inhalation two times a day    HYDROmorphone  Injectable 0.5 milliGRAM(s) IV Push every 4 hours PRN  HYDROmorphone  Injectable 1 milliGRAM(s) IV Push every 4 hours PRN  ondansetron Injectable 4 milliGRAM(s) IV Push every 6 hours PRN    pantoprazole    Tablet 40 milliGRAM(s) Oral before breakfast    atorvastatin 40 milliGRAM(s) Oral at bedtime    aspirin  chewable 81 milliGRAM(s) Oral daily  heparin  Injectable 7500 Unit(s) SubCutaneous every 8 hours  lactated ringers. 1000 milliLiter(s) IV Continuous <Continuous>        PHYSICAL EXAM:  T(C): 36.8 (02-28-18 @ 05:38), Max: 37.5 (02-28-18 @ 00:18)  HR: 64 (02-28-18 @ 05:38) (64 - 80)  BP: 135/75 (02-28-18 @ 05:38) (124/73 - 138/74)  RR: 20 (02-28-18 @ 05:38) (17 - 25)  SpO2: 94% (02-28-18 @ 05:38) (91% - 94%)  Wt(kg): --  I&O's Summary    27 Feb 2018 07:01  -  28 Feb 2018 07:00  --------------------------------------------------------  IN: 2850 mL / OUT: 1350 mL / NET: 1500 mL          Appearance: Normal	  HEENT:   Normal oral mucosa, PERRL, EOMI	  Lymphatic: No lymphadenopathy  Cardiovascular: Normal S1 S2, No JVD, No murmurs, trace edema  Respiratory: Wheezing on auscultation	  Psychiatry: A & O x 3, Mood & affect appropriate  Gastrointestinal:  Soft, ,RUQ tenderness, + guarding  Neurologic: Non-focal  Extremities: Normal range of motion, No clubbing, cyanosis , trace edema with chronic stasis changes  Vascular: Peripheral pulses palpable 2+ bilaterally    TELEMETRY: 	    ECG:  	  RADIOLOGY:   DIAGNOSTIC TESTING:  [ ] Echocardiogram:  [ ]  Catheterization:  [ ] Stress Test:    OTHER: 	    LABS:	 	    CARDIAC MARKERS:                                  14.2   12.0  )-----------( 254      ( 28 Feb 2018 07:15 )             47.0     02-28    142  |  100  |  16  ----------------------------<  114<H>  4.0   |  35<H>  |  0.72    Ca    9.0      28 Feb 2018 07:15  Phos  2.4     02-28  Mg     1.9     02-28    TPro  6.9  /  Alb  3.4  /  TBili  0.9  /  DBili  x   /  AST  24  /  ALT  37  /  AlkPhos  71  02-28    proBNP:   Lipid Profile:   HgA1c:   TSH:     ASSESSMENT/PLAN:

## 2018-02-28 NOTE — PHYSICAL THERAPY INITIAL EVALUATION ADULT - PHYSICAL ASSIST/NONPHYSICAL ASSIST: SIT/STAND, REHAB EVAL
slightly unsteady; performed 2 squat-pivots towards head of bed prior to returning to supine/verbal cues

## 2018-02-28 NOTE — PHYSICAL THERAPY INITIAL EVALUATION ADULT - PERTINENT HX OF CURRENT PROBLEM, REHAB EVAL
This is a 78 yo M with COPD (on 2L oxygen), SOURAV (on CPAP), HTN, CAD (s/p 3 stents and balloon angioplasty), BPH, p/w RUQ pain for 10 hours. The pain was described as 7/10 at RUQ and radiated to the back. +nausea, no vomiting, no chills/fever, not associated with meals. No SOB/chest pain or dizziness. Please refer to H&P on [place] for remaining.

## 2018-02-28 NOTE — PROGRESS NOTE ADULT - SUBJECTIVE AND OBJECTIVE BOX
Interval Events: Reviewed  Patient seen and examined at bedside.    Patient is a 77y old  Male who presents with a chief complaint of RUQ pain since 10 hours ago (26 Feb 2018 09:46)  he is doing better but he vomited last night    PAST MEDICAL & SURGICAL HISTORY:  Cholelithiasis  Arthritis  HLD (hyperlipidemia)  CAD (coronary artery disease)  BPH (benign prostatic hyperplasia)  COPD (chronic obstructive pulmonary disease)  CAD S/P percutaneous coronary angioplasty  H/O lithotripsy      MEDICATIONS:  Pulmonary:  ALBUTerol/ipratropium for Nebulization 3 milliLiter(s) Nebulizer every 6 hours  buDESOnide 160 MICROgram(s)/formoterol 4.5 MICROgram(s) Inhaler 2 Puff(s) Inhalation two times a day    Antimicrobials:  piperacillin/tazobactam IVPB. 3.375 Gram(s) IV Intermittent every 6 hours    Anticoagulants:  aspirin  chewable 81 milliGRAM(s) Oral daily  heparin  Injectable 7500 Unit(s) SubCutaneous every 8 hours    Cardiac:  losartan 100 milliGRAM(s) Oral daily  tamsulosin 0.4 milliGRAM(s) Oral at bedtime      Allergies    morphine (Unknown)    Intolerances        Vital Signs Last 24 Hrs  T(C): 36.4 (28 Feb 2018 14:01), Max: 37.5 (28 Feb 2018 00:18)  T(F): 97.6 (28 Feb 2018 14:01), Max: 99.5 (28 Feb 2018 00:18)  HR: 60 (28 Feb 2018 12:13) (58 - 82)  BP: 165/79 (28 Feb 2018 12:13) (134/77 - 195/87)  BP(mean): 113 (28 Feb 2018 12:13) (112 - 125)  RR: 18 (28 Feb 2018 11:55) (16 - 25)  SpO2: 91% (28 Feb 2018 12:13) (89% - 98%)    02-27 @ 07:01  -  02-28 @ 07:00  --------------------------------------------------------  IN: 2850 mL / OUT: 1350 mL / NET: 1500 mL    02-28 @ 07:01 - 02-28 @ 14:31  --------------------------------------------------------  IN: 200 mL / OUT: 0 mL / NET: 200 mL          LABS:      CBC Full  -  ( 28 Feb 2018 07:15 )  WBC Count : 12.0 K/uL  Hemoglobin : 14.2 g/dL  Hematocrit : 47.0 %  Platelet Count - Automated : 254 K/uL  Mean Cell Volume : 94.6 fL  Mean Cell Hemoglobin : 28.6 pg  Mean Cell Hemoglobin Concentration : 30.2 g/dL  Auto Neutrophil # : x  Auto Lymphocyte # : x  Auto Monocyte # : x  Auto Eosinophil # : x  Auto Basophil # : x  Auto Neutrophil % : x  Auto Lymphocyte % : x  Auto Monocyte % : x  Auto Eosinophil % : x  Auto Basophil % : x    02-28    142  |  100  |  16  ----------------------------<  114<H>  4.0   |  35<H>  |  0.72    Ca    9.0      28 Feb 2018 07:15  Phos  2.4     02-28  Mg     1.9     02-28    TPro  6.9  /  Alb  3.4  /  TBili  0.9  /  DBili  x   /  AST  24  /  ALT  37  /  AlkPhos  71  02-28                    Culture Results:   No growth to date. (02-27 @ 21:11)      RADIOLOGY & ADDITIONAL STUDIES (The following images were personally reviewed):  Mejia:                                     No  Urine output:                       adequate  DVT prophylaxis:                 Yes  Flattus:                                  Yes  Bowel movement:              No

## 2018-02-28 NOTE — PHYSICAL THERAPY INITIAL EVALUATION ADULT - GENERAL OBSERVATIONS, REHAB EVAL
Chart reviewed. IE Completed. Patient with complaints of dizziness at rest (as per DAYDAY Hu, received Dilaudid about 1 hour prior and VSS), however remained agreeable to PT stating "I will try". Patient received in (L) side-lying, NAD, +IV, +(R) abdominal drain, 3LO2NC, DAYDAY Hu cleared patient for treatment.

## 2018-03-01 LAB
ALBUMIN SERPL ELPH-MCNC: 3 G/DL — LOW (ref 3.3–5)
ALP SERPL-CCNC: 80 U/L — SIGNIFICANT CHANGE UP (ref 40–120)
ALT FLD-CCNC: 53 U/L — HIGH (ref 10–45)
ANION GAP SERPL CALC-SCNC: 7 MMOL/L — SIGNIFICANT CHANGE UP (ref 5–17)
AST SERPL-CCNC: 30 U/L — SIGNIFICANT CHANGE UP (ref 10–40)
BILIRUB SERPL-MCNC: 0.6 MG/DL — SIGNIFICANT CHANGE UP (ref 0.2–1.2)
BUN SERPL-MCNC: 12 MG/DL — SIGNIFICANT CHANGE UP (ref 7–23)
CALCIUM SERPL-MCNC: 8.7 MG/DL — SIGNIFICANT CHANGE UP (ref 8.4–10.5)
CHLORIDE SERPL-SCNC: 97 MMOL/L — SIGNIFICANT CHANGE UP (ref 96–108)
CO2 SERPL-SCNC: 37 MMOL/L — HIGH (ref 22–31)
CREAT SERPL-MCNC: 0.67 MG/DL — SIGNIFICANT CHANGE UP (ref 0.5–1.3)
GLUCOSE SERPL-MCNC: 100 MG/DL — HIGH (ref 70–99)
HCT VFR BLD CALC: 43.6 % — SIGNIFICANT CHANGE UP (ref 39–50)
HGB BLD-MCNC: 13.1 G/DL — SIGNIFICANT CHANGE UP (ref 13–17)
MAGNESIUM SERPL-MCNC: 2 MG/DL — SIGNIFICANT CHANGE UP (ref 1.6–2.6)
MCHC RBC-ENTMCNC: 29 PG — SIGNIFICANT CHANGE UP (ref 27–34)
MCHC RBC-ENTMCNC: 30 G/DL — LOW (ref 32–36)
MCV RBC AUTO: 96.7 FL — SIGNIFICANT CHANGE UP (ref 80–100)
PHOSPHATE SERPL-MCNC: 2.2 MG/DL — LOW (ref 2.5–4.5)
PLATELET # BLD AUTO: 244 K/UL — SIGNIFICANT CHANGE UP (ref 150–400)
POTASSIUM SERPL-MCNC: 3.7 MMOL/L — SIGNIFICANT CHANGE UP (ref 3.5–5.3)
POTASSIUM SERPL-SCNC: 3.7 MMOL/L — SIGNIFICANT CHANGE UP (ref 3.5–5.3)
PROT SERPL-MCNC: 6.7 G/DL — SIGNIFICANT CHANGE UP (ref 6–8.3)
RBC # BLD: 4.51 M/UL — SIGNIFICANT CHANGE UP (ref 4.2–5.8)
RBC # FLD: 14.9 % — SIGNIFICANT CHANGE UP (ref 10.3–16.9)
SODIUM SERPL-SCNC: 141 MMOL/L — SIGNIFICANT CHANGE UP (ref 135–145)
WBC # BLD: 11.5 K/UL — HIGH (ref 3.8–10.5)
WBC # FLD AUTO: 11.5 K/UL — HIGH (ref 3.8–10.5)

## 2018-03-01 PROCEDURE — 99232 SBSQ HOSP IP/OBS MODERATE 35: CPT

## 2018-03-01 PROCEDURE — 99233 SBSQ HOSP IP/OBS HIGH 50: CPT | Mod: GC

## 2018-03-01 PROCEDURE — 93306 TTE W/DOPPLER COMPLETE: CPT | Mod: 26

## 2018-03-01 RX ORDER — POTASSIUM PHOSPHATE, MONOBASIC POTASSIUM PHOSPHATE, DIBASIC 236; 224 MG/ML; MG/ML
15 INJECTION, SOLUTION INTRAVENOUS ONCE
Refills: 0 | Status: COMPLETED | OUTPATIENT
Start: 2018-03-01 | End: 2018-03-01

## 2018-03-01 RX ADMIN — PIPERACILLIN AND TAZOBACTAM 200 GRAM(S): 4; .5 INJECTION, POWDER, LYOPHILIZED, FOR SOLUTION INTRAVENOUS at 23:07

## 2018-03-01 RX ADMIN — BUDESONIDE AND FORMOTEROL FUMARATE DIHYDRATE 2 PUFF(S): 160; 4.5 AEROSOL RESPIRATORY (INHALATION) at 18:49

## 2018-03-01 RX ADMIN — PIPERACILLIN AND TAZOBACTAM 200 GRAM(S): 4; .5 INJECTION, POWDER, LYOPHILIZED, FOR SOLUTION INTRAVENOUS at 12:35

## 2018-03-01 RX ADMIN — Medication 3 MILLILITER(S): at 12:35

## 2018-03-01 RX ADMIN — PANTOPRAZOLE SODIUM 40 MILLIGRAM(S): 20 TABLET, DELAYED RELEASE ORAL at 06:09

## 2018-03-01 RX ADMIN — POTASSIUM PHOSPHATE, MONOBASIC POTASSIUM PHOSPHATE, DIBASIC 62.5 MILLIMOLE(S): 236; 224 INJECTION, SOLUTION INTRAVENOUS at 12:36

## 2018-03-01 RX ADMIN — PIPERACILLIN AND TAZOBACTAM 200 GRAM(S): 4; .5 INJECTION, POWDER, LYOPHILIZED, FOR SOLUTION INTRAVENOUS at 05:57

## 2018-03-01 RX ADMIN — TAMSULOSIN HYDROCHLORIDE 0.4 MILLIGRAM(S): 0.4 CAPSULE ORAL at 23:04

## 2018-03-01 RX ADMIN — HEPARIN SODIUM 7500 UNIT(S): 5000 INJECTION INTRAVENOUS; SUBCUTANEOUS at 14:12

## 2018-03-01 RX ADMIN — HEPARIN SODIUM 7500 UNIT(S): 5000 INJECTION INTRAVENOUS; SUBCUTANEOUS at 05:57

## 2018-03-01 RX ADMIN — ATORVASTATIN CALCIUM 40 MILLIGRAM(S): 80 TABLET, FILM COATED ORAL at 23:04

## 2018-03-01 RX ADMIN — Medication 3 MILLILITER(S): at 05:56

## 2018-03-01 RX ADMIN — PIPERACILLIN AND TAZOBACTAM 200 GRAM(S): 4; .5 INJECTION, POWDER, LYOPHILIZED, FOR SOLUTION INTRAVENOUS at 17:24

## 2018-03-01 RX ADMIN — Medication 3 MILLILITER(S): at 17:24

## 2018-03-01 RX ADMIN — LOSARTAN POTASSIUM 100 MILLIGRAM(S): 100 TABLET, FILM COATED ORAL at 05:57

## 2018-03-01 RX ADMIN — HEPARIN SODIUM 7500 UNIT(S): 5000 INJECTION INTRAVENOUS; SUBCUTANEOUS at 23:04

## 2018-03-01 RX ADMIN — BUDESONIDE AND FORMOTEROL FUMARATE DIHYDRATE 2 PUFF(S): 160; 4.5 AEROSOL RESPIRATORY (INHALATION) at 05:56

## 2018-03-01 RX ADMIN — Medication 81 MILLIGRAM(S): at 12:35

## 2018-03-01 NOTE — PROGRESS NOTE ADULT - SUBJECTIVE AND OBJECTIVE BOX
INTERVAL HISTORY:  s/p resp distress  s/p IR drainage  	  MEDICATIONS:  losartan 100 milliGRAM(s) Oral daily  tamsulosin 0.4 milliGRAM(s) Oral at bedtime    piperacillin/tazobactam IVPB. 3.375 Gram(s) IV Intermittent every 6 hours    ALBUTerol/ipratropium for Nebulization 3 milliLiter(s) Nebulizer every 6 hours  buDESOnide 160 MICROgram(s)/formoterol 4.5 MICROgram(s) Inhaler 2 Puff(s) Inhalation two times a day    acetaminophen   Tablet. 650 milliGRAM(s) Oral every 6 hours PRN  acetaminophen   Tablet. 975 milliGRAM(s) Oral every 6 hours PRN  ondansetron Injectable 4 milliGRAM(s) IV Push every 6 hours PRN    pantoprazole    Tablet 40 milliGRAM(s) Oral before breakfast    atorvastatin 40 milliGRAM(s) Oral at bedtime    aspirin  chewable 81 milliGRAM(s) Oral daily  heparin  Injectable 7500 Unit(s) SubCutaneous every 8 hours  potassium phosphate IVPB 15 milliMole(s) IV Intermittent once        PHYSICAL EXAM:  T(C): 36.4 (03-01-18 @ 05:06), Max: 36.8 (02-28-18 @ 21:00)  HR: 60 (03-01-18 @ 06:05) (54 - 106)  BP: 140/66 (03-01-18 @ 05:00) (114/56 - 195/87)  RR: 20 (03-01-18 @ 06:05) (16 - 22)  SpO2: 90% (03-01-18 @ 06:05) (89% - 98%)  Wt(kg): --  I&O's Summary    28 Feb 2018 07:01  -  01 Mar 2018 07:00  --------------------------------------------------------  IN: 400 mL / OUT: 475 mL / NET: -75 mL          Appearance: Obese	  HEENT:   Normal oral mucosa, PERRL, EOMI	  Lymphatic: No lymphadenopathy  Cardiovascular: Normal S1 S2, No JVD, No murmurs, trace edema  Respiratory: Bibasilar crackles and prolonged expiration  Psychiatry: A & O x 3, Mood & affect appropriate  Gastrointestinal:  Soft, Non-tender, + BS	  Skin: Mild stasis changes, No ecchymoses, No cyanosis  Neurologic: Non-focal  Extremities:  No clubbing, cyanosis, trace edema  Vascular: Peripheral pulses palpable 2+ bilaterally    TELEMETRY: 	    ECG:  	  RADIOLOGY:   DIAGNOSTIC TESTING:  [ ] Echocardiogram:  [ ]  Catheterization:  [ ] Stress Test:    OTHER: 	    LABS:	 	    CARDIAC MARKERS:                                  13.1   11.5  )-----------( 244      ( 01 Mar 2018 06:10 )             43.6     03-01    141  |  97  |  12  ----------------------------<  100<H>  3.7   |  37<H>  |  0.67    Ca    8.7      01 Mar 2018 06:09  Phos  2.2     03-01  Mg     2.0     03-01    TPro  6.7  /  Alb  3.0<L>  /  TBili  0.6  /  DBili  x   /  AST  30  /  ALT  53<H>  /  AlkPhos  80  03-01    proBNP: Serum Pro-Brain Natriuretic Peptide: 407 pg/mL (02-28 @ 13:23)    Lipid Profile:   HgA1c:   TSH:     ASSESSMENT/PLAN:

## 2018-03-01 NOTE — CONSULT NOTE ADULT - ASSESSMENT
78 yo M with COPD (on 2L oxygen), SOURAV (on CPAP), HTN, CAD (s/p 3 stents and balloon angioplasty), BPH, p/w RUQ pain for 10 hours s/p perc jinny

## 2018-03-01 NOTE — CONSULT NOTE ADULT - SUBJECTIVE AND OBJECTIVE BOX
Patient is a 77y old  Male who presents with a chief complaint of RUQ pain since 10 hours ago (26 Feb 2018 09:46)       HPI:  This is a 78 yo M with COPD (on 2L oxygen), SOURAV (on CPAP), HTN, CAD (s/p 3 stents and balloon angioplasty), BPH, p/w RUQ pain for 10 hours. The pain was described as 7/10 at RUQ and radiated to the back. +nausea, no vomiting, no chills/fever, not associated with meals. No SOB/chest pain or dizziness.     Never have colonoscopy before  Last meal was yesterday dinner  last plavix yesterday morning (26 Feb 2018 09:46)      PAST MEDICAL & SURGICAL HISTORY:  Cholelithiasis  Arthritis  HLD (hyperlipidemia)  CAD (coronary artery disease)  BPH (benign prostatic hyperplasia)  COPD (chronic obstructive pulmonary disease)  CAD S/P percutaneous coronary angioplasty  H/O lithotripsy      MEDICATIONS  (STANDING):  ALBUTerol/ipratropium for Nebulization 3 milliLiter(s) Nebulizer every 6 hours  aspirin  chewable 81 milliGRAM(s) Oral daily  atorvastatin 40 milliGRAM(s) Oral at bedtime  buDESOnide 160 MICROgram(s)/formoterol 4.5 MICROgram(s) Inhaler 2 Puff(s) Inhalation two times a day  heparin  Injectable 7500 Unit(s) SubCutaneous every 8 hours  losartan 100 milliGRAM(s) Oral daily  pantoprazole    Tablet 40 milliGRAM(s) Oral before breakfast  piperacillin/tazobactam IVPB. 3.375 Gram(s) IV Intermittent every 6 hours  potassium phosphate IVPB 15 milliMole(s) IV Intermittent once  tamsulosin 0.4 milliGRAM(s) Oral at bedtime    MEDICATIONS  (PRN):  acetaminophen   Tablet. 650 milliGRAM(s) Oral every 6 hours PRN Moderate Pain (4 - 6)  acetaminophen   Tablet. 975 milliGRAM(s) Oral every 6 hours PRN Severe Pain (7 - 10)  ondansetron Injectable 4 milliGRAM(s) IV Push every 6 hours PRN Nausea      Social History: lives with his wife in an elevator accessible apartment building, no previous home care services    Functional Level Prior to Admission: ADL independent, walks with a cane prn    FAMILY HISTORY:      CBC Full  -  ( 01 Mar 2018 06:10 )  WBC Count : 11.5 K/uL  Hemoglobin : 13.1 g/dL  Hematocrit : 43.6 %  Platelet Count - Automated : 244 K/uL  Mean Cell Volume : 96.7 fL  Mean Cell Hemoglobin : 29.0 pg  Mean Cell Hemoglobin Concentration : 30.0 g/dL  Auto Neutrophil # : x  Auto Lymphocyte # : x  Auto Monocyte # : x  Auto Eosinophil # : x  Auto Basophil # : x  Auto Neutrophil % : x  Auto Lymphocyte % : x  Auto Monocyte % : x  Auto Eosinophil % : x  Auto Basophil % : x      03-01    141  |  97  |  12  ----------------------------<  100<H>  3.7   |  37<H>  |  0.67    Ca    8.7      01 Mar 2018 06:09  Phos  2.2     03-01  Mg     2.0     03-01    TPro  6.7  /  Alb  3.0<L>  /  TBili  0.6  /  DBili  x   /  AST  30  /  ALT  53<H>  /  AlkPhos  80  03-01            Radiology:    < from: US Abdomen Limited (02.26.18 @ 08:39) >  EXAM:  US ABDOMEN LIMITED                          PROCEDURE DATE:  02/26/2018                     INTERPRETATION:    RIGHT UPPER QUADRANT ULTRASOUND dated 2/26/2018 8:39 AM    INDICATION: Nausea, vomiting, right upper quadrant pain    PRIOR STUDIES: None.    FINDINGS:   Liver: Diffuse increased parenchymal echogenicity, in keeping with   steatosis. There is no focal parenchymal abnormality. Liver span is   normal, measuring up to 17.7 cm in craniocaudal dimension.    Intrahepatic ducts: Not dilated.    Common bile duct: Normal diameter, measuring 0.6 cm.    Gallbladder: Two mobile shadowing gallstones are seen in the fundus,   approximately 1.0 cm in size. No wall thickening or pericholecystic   fluid.  Negative sonographic Enrique's sign.    Pancreas: The visualized portions are of uniform echotexture and   unremarkable in appearance.      Abdominal aorta: The visualized portions were unremarkable.    Inferior vena cava: The visualized portions were normal in appearance.    Right kidney: Normal echogenicity. No focal lesions. Length of 11.9 cm.    Ascites: None in the right upper quadrant      IMPRESSION:  1.  Cholelithiasis without secondary sonographic signs of acute   cholecystitis.    2.  Hepatic steatosis.                  Vital Signs Last 24 Hrs  T(C): 36.4 (01 Mar 2018 05:06), Max: 36.8 (28 Feb 2018 21:00)  T(F): 97.5 (01 Mar 2018 05:06), Max: 98.3 (28 Feb 2018 21:00)  HR: 66 (01 Mar 2018 08:38) (54 - 106)  BP: 103/52 (01 Mar 2018 08:38) (103/52 - 195/87)  BP(mean): 74 (01 Mar 2018 08:38) (74 - 125)  RR: 18 (01 Mar 2018 08:38) (16 - 22)  SpO2: 92% (01 Mar 2018 08:38) (89% - 98%)    REVIEW OF SYSTEMS:    CONSTITUTIONAL:  fatigue  EYES: No eye pain, visual disturbances, or discharge  ENMT:  No difficulty hearing, tinnitus, vertigo; No sinus or throat pain  NECK: No pain or stiffness  BREASTS: No pain, masses, or nipple discharge  RESPIRATORY: No cough, wheezing, chills or hemoptysis; No shortness of breath  CARDIOVASCULAR: No chest pain, palpitations, dizziness, or leg swelling  GASTROINTESTINAL: No abdominal or epigastric pain. No nausea, vomiting, or hematemesis; No diarrhea or constipation. No melena or hematochezia.  GENITOURINARY: No dysuria, frequency, hematuria, or incontinence  NEUROLOGICAL: No headaches, memory loss, loss of strength, numbness, or tremors  SKIN: No itching, burning, rashes, or lesions   LYMPH NODES: No enlarged glands  ENDOCRINE: No heat or cold intolerance; No hair loss  MUSCULOSKELETAL: No joint pain or swelling; No muscle, back, or extremity pain  PSYCHIATRIC: No depression, anxiety, mood swings, or difficulty sleeping  HEME/LYMPH: No easy bruising, or bleeding gums  ALLERGY AND IMMUNOLOGIC: No hives or eczema      Physical Exam: Persian gentleman lying in semi Fowlers position,     HEENT: normocephalic,  anicteric,  atraumatic    Neck: supple, negative JVD, negative carotid bruits,    Chest: bibasilar crackles    Cardiovascular: regular rate and rhythm, neg murmurs/rubs/gallops    Abdomen: obese, perc chol drain in place, non tender, negative rebound/guarding, normal bowel sounds, neg hepatosplenomegaly    Extremities: trace edema, negative calf tenderness to palpation, negative Cary's sign    :     Neurologic Exam:    Alert and oriented to person, place, date/year, speech fluent w/o dysarthria, repetition intact, comprehension intact,     Cranial Nerves:     II:                       pupils equal, round and reactive to light, visual fields intact   III/ IV/VI:             extraocular movements intact, neg nystagmus, ptosis  V:                      facial sensation intact, V1-3 normal  VII:                     face symmetric, no droop, normal eye closure and smile  VIII:                    hearing intact to finger rub bilaterally  IX/ X:                 soft palate rise symmetrical  XI:                      head turning, shoulder shrug normal  XII:                     tongue midline    Motor Exam:    Bilateral UE:         5/5 /intrinsics                            5/5 biceps/triceps/wrist extensors-flexors/deltoid                            negative pronator drift      Bilateral LE:         5/5 hip flexors/adductors/abductors                            5/5 quadriceps/hamstrings                            5/5 dorsiflexors/plantar flexors/invertors-evertors    Sensory:              intact to LT/PP in all UE/LE dermatomes    DTR:                   = biceps/     triceps/     brachioradialis                            = patella/   medial hamstring/    ankle                            neg clonus                            neg Babinski                            neg Hoffmans    Finger to Nose:  wnl    Heel to Shin:       wnl    Rapid Alternating movements:   wnl    Joint Position Sense:  intact    Romberg:  not tested    Tandem Walking:  not tested    Gait:  not tested        PM&R Impression:    1) deconditioned  2) no focal weakness      Recommendations:    1) Physical therapy focusing on therapeutic exercises, bed mobility/transfer out of bed evaluation, progressive ambulation with assistive devices.    2) Disposition Plan: anticipate d/c home, home physical therapy
Patient is a 77y old  Male who presents with a chief complaint of RUQ pain since 10 hours ago (26 Feb 2018 09:46)      HPI:  This is a 76 yo M with COPD (on 2L oxygen), SOURAV (on CPAP), HTN, CAD (s/p 3 stents and balloon angioplasty), BPH, p/w RUQ pain for 10 hours. The pain was described as 7/10 at RUQ and radiated to the back. +nausea, no vomiting, no chills/fever, not associated with meals. No SOB/chest pain or dizziness.     Never have colonoscopy before  Last meal was yesterday dinner  last plavix yesterday morning (26 Feb 2018 09:46)      PAST MEDICAL & SURGICAL HISTORY:  Cholelithiasis  Arthritis  HLD (hyperlipidemia)  CAD (coronary artery disease)  BPH (benign prostatic hyperplasia)  COPD (chronic obstructive pulmonary disease)  CAD S/P percutaneous coronary angioplasty  H/O lithotripsy      PREVIOUS DIAGNOSTIC TESTING:      ECHO  FINDINGS:    STRESS  FINDINGS:    CATHETERIZATION  FINDINGS:    MEDICATIONS  (STANDING):  ALBUTerol/ipratropium for Nebulization 3 milliLiter(s) Nebulizer every 6 hours  aspirin  chewable 81 milliGRAM(s) Oral daily  atorvastatin 40 milliGRAM(s) Oral at bedtime  buDESOnide 160 MICROgram(s)/formoterol 4.5 MICROgram(s) Inhaler 2 Puff(s) Inhalation two times a day  heparin  Injectable 7500 Unit(s) SubCutaneous every 8 hours  lactated ringers. 1000 milliLiter(s) (150 mL/Hr) IV Continuous <Continuous>  losartan 100 milliGRAM(s) Oral daily  pantoprazole    Tablet 40 milliGRAM(s) Oral before breakfast  piperacillin/tazobactam IVPB. 3.375 Gram(s) IV Intermittent every 6 hours  tamsulosin 0.4 milliGRAM(s) Oral at bedtime    MEDICATIONS  (PRN):  HYDROmorphone  Injectable 0.5 milliGRAM(s) IV Push every 4 hours PRN Moderate Pain  HYDROmorphone  Injectable 1 milliGRAM(s) IV Push every 4 hours PRN Severe Pain  ondansetron Injectable 4 milliGRAM(s) IV Push every 6 hours PRN Nausea      FAMILY HISTORY: + HTN      SOCIAL HISTORY:    CIGARETTES: none    ALCOHOL: rare    REVIEW OF SYSTEMS:  CONSTITUTIONAL: No fever, weight loss, or fatigue  EYES: No eye pain, visual disturbances, or discharge  ENMT:  No difficulty hearing, tinnitus, vertigo; No sinus or throat pain  NECK: No pain or stiffness  RESPIRATORY: No cough, wheezing, chills or hemoptysis; No Shortness of Breath  CARDIOVASCULAR: No chest pain, palpitations, passing out, dizziness, or leg swelling  GASTROINTESTINAL: + abdominal or epigastric pain. + nausea, no vomiting, or hematemesis; No diarrhea or constipation. No melena or hematochezia.  GENITOURINARY: No dysuria, + frequency, no hematuria, or incontinence  NEUROLOGICAL: No headaches, memory loss, loss of strength, numbness, or tremors  SKIN: No itching, burning, rashes, or lesions   LYMPH Nodes: No enlarged glands  ENDOCRINE: No heat or cold intolerance; No hair loss  MUSCULOSKELETAL: + joint pain , no swelling; No muscle, back, or extremity pain  PSYCHIATRIC: No depression, anxiety, mood swings, or difficulty sleeping  HEME/LYMPH: No easy bruising, or bleeding gums  ALLERY AND IMMUNOLOGIC: No hives or eczema	  Vital Signs Last 24 Hrs  T(C): 35.8 (27 Feb 2018 05:46), Max: 36.9 (26 Feb 2018 11:08)  T(F): 96.4 (27 Feb 2018 05:46), Max: 98.5 (26 Feb 2018 11:08)  HR: 74 (27 Feb 2018 05:46) (60 - 75)  BP: 167/74 (27 Feb 2018 05:46) (139/74 - 167/74)  BP(mean): --  RR: 18 (27 Feb 2018 06:35) (17 - 24)  SpO2: 92% (27 Feb 2018 06:35) (82% - 98%)    PHYSICAL EXAM:  Appearance: Normal	Obese  HEENT:   Normal oral mucosa, PERRL, EOMI	  Lymphatic: No lymphadenopathy  Cardiovascular: Normal S1 S2, No JVD, No murmurs, No edema  Respiratory: + rhonchi  Psychiatry: A & O x 3, Mood & affect appropriate  Gastrointestinal:  Soft, Non-tender, + BS	  Skin: + stasis changes, No ecchymoses, No cyanosis  Neurologic: Non-focal  Extremities: Normal range of motion, No clubbing, cyanosis or edema  Vascular: Peripheral pulses palpable 2+ bilaterally      INTERPRETATION OF TELEMETRY:    ECG:    I&O's Detail    26 Feb 2018 07:01 - 27 Feb 2018 07:00  --------------------------------------------------------  IN:    IV PiggyBack: 400 mL    lactated ringers.: 2600 mL    Oral Fluid: 320 mL    Sodium Chloride 0.9% IV Bolus: 1000 mL  Total IN: 4320 mL    OUT:    Voided: 400 mL  Total OUT: 400 mL    Total NET: 3920 mL          LABS:                        15.8   14.4  )-----------( 293      ( 26 Feb 2018 06:07 )             50.6     02-26    139  |  99  |  23  ----------------------------<  137<H>  5.1   |  29  |  0.69    Ca    8.5      26 Feb 2018 08:15  Mg     2.0     02-26    TPro  8.0  /  Alb  3.8  /  TBili  0.3  /  DBili  x   /  AST  14  /  ALT  12  /  AlkPhos  86  02-26        PT/INR - ( 26 Feb 2018 06:07 )   PT: 11.3 sec;   INR: 1.02          PTT - ( 26 Feb 2018 06:07 )  PTT:27.8 sec    I&O's Summary    26 Feb 2018 07:01  -  27 Feb 2018 07:00  --------------------------------------------------------  IN: 4320 mL / OUT: 400 mL / NET: 3920 mL      BNP  RADIOLOGY & ADDITIONAL STUDIES:
Patient is a 77y old  Male who presents with a chief complaint of RUQ pain since 10 hours ago (26 Feb 2018 09:46)      HPI:  This is a 76 yo M with COPD (on 2L oxygen), SOURAV (on CPAP), HTN, CAD (s/p 3 stents and balloon angioplasty), BPH, p/w RUQ pain for 10 hours. The pain was described as 7/10 at RUQ and radiated to the back. +nausea, no vomiting, no chills/fever, not associated with meals. No SOB/chest pain or dizziness.     Never have colonoscopy before  Last meal was yesterday dinner  last plavix yesterday morning (26 Feb 2018 09:46)      PAST MEDICAL & SURGICAL HISTORY:  Cholelithiasis  Arthritis  HLD (hyperlipidemia)  CAD (coronary artery disease)  BPH (benign prostatic hyperplasia)  COPD (chronic obstructive pulmonary disease)  CAD S/P percutaneous coronary angioplasty  H/O lithotripsy      FAMILY HISTORY:      SOCIAL HISTORY:  Smoking Status: [ ] Current, [x ] Former, [ ] Never  Pack Years:    MEDICATIONS:  Pulmonary:  ALBUTerol/ipratropium for Nebulization 3 milliLiter(s) Nebulizer every 6 hours  buDESOnide 160 MICROgram(s)/formoterol 4.5 MICROgram(s) Inhaler 2 Puff(s) Inhalation two times a day    Antimicrobials:  piperacillin/tazobactam IVPB. 3.375 Gram(s) IV Intermittent every 6 hours    Anticoagulants:  aspirin  chewable 81 milliGRAM(s) Oral daily  heparin  Injectable 7500 Unit(s) SubCutaneous every 8 hours    Onc:    GI/:  pantoprazole    Tablet 40 milliGRAM(s) Oral before breakfast    Endocrine:  atorvastatin 40 milliGRAM(s) Oral at bedtime    Cardiac:  losartan 100 milliGRAM(s) Oral daily  tamsulosin 0.4 milliGRAM(s) Oral at bedtime    Other Medications:  HYDROmorphone  Injectable 0.5 milliGRAM(s) IV Push every 4 hours PRN  HYDROmorphone  Injectable 1 milliGRAM(s) IV Push every 4 hours PRN  lactated ringers. 1000 milliLiter(s) IV Continuous <Continuous>  ondansetron Injectable 4 milliGRAM(s) IV Push every 6 hours PRN      Allergies    morphine (Unknown)    Intolerances        Vital Signs Last 24 Hrs  T(C): 36.3 (27 Feb 2018 13:33), Max: 36.8 (26 Feb 2018 23:38)  T(F): 97.4 (27 Feb 2018 13:33), Max: 98.2 (26 Feb 2018 23:38)  HR: 75 (27 Feb 2018 17:30) (66 - 75)  BP: 134/72 (27 Feb 2018 13:33) (124/73 - 167/74)  BP(mean): --  RR: 17 (27 Feb 2018 13:33) (17 - 24)  SpO2: 93% (27 Feb 2018 17:30) (90% - 94%)    02-26 @ 07:01  -  02-27 @ 07:00  --------------------------------------------------------  IN: 4320 mL / OUT: 400 mL / NET: 3920 mL    02-27 @ 07:01 - 02-27 @ 22:37  --------------------------------------------------------  IN: 2150 mL / OUT: 500 mL / NET: 1650 mL          LABS:      CBC Full  -  ( 27 Feb 2018 09:14 )  WBC Count : 14.7 K/uL  Hemoglobin : 14.9 g/dL  Hematocrit : 48.5 %  Platelet Count - Automated : 256 K/uL  Mean Cell Volume : 91.5 fL  Mean Cell Hemoglobin : 28.1 pg  Mean Cell Hemoglobin Concentration : 30.7 g/dL  Auto Neutrophil # : x  Auto Lymphocyte # : x  Auto Monocyte # : x  Auto Eosinophil # : x  Auto Basophil # : x  Auto Neutrophil % : x  Auto Lymphocyte % : x  Auto Monocyte % : x  Auto Eosinophil % : x  Auto Basophil % : x    02-27    139  |  99  |  18  ----------------------------<  137<H>  4.1   |  31  |  0.66    Ca    8.7      27 Feb 2018 07:05  Phos  3.0     02-27  Mg     1.9     02-27    TPro  7.2  /  Alb  3.6  /  TBili  0.7  /  DBili  x   /  AST  29  /  ALT  34  /  AlkPhos  75  02-27    PT/INR - ( 26 Feb 2018 06:07 )   PT: 11.3 sec;   INR: 1.02          PTT - ( 26 Feb 2018 06:07 )  PTT:27.8 sec      < from: Xray Chest 1 View- PORTABLE-Routine (02.26.18 @ 07:52) >  EXAM:  XR CHEST PORTABLE ROUTINE 1V                          PROCEDURE DATE:  02/26/2018                     INTERPRETATION:  INDICATION: pre-op testing. Medical clearance. Right   upper quadrant pain.    TECHNIQUE: Chest, single portable AP view on  2/26/2018 7:52 AM     COMPARISON: No prior exams are available for comparison at this time.    FINDINGS:  Lungs are hypoinflated with accentuated bronchovascular markings   bilaterally. Cannot definitively exclude superimposed mild pulmonary   congestion.  There is no evidence of focal airspace consolidation in either lung.    Right costophrenic angle is sharp, without sizable pleural effusion. No   pneumothorax is seen.  Cardiac silhouette appears enlarged; and there is obscuration of the left  costophrenic angle/peripheral left lung base, slightly due to a prominent   pericardial fat pad.  Mediastinal and hilar contours appear grossly unremarkable.   Visualized bony thorax demonstrates no significant abnormality.       IMPRESSION:   Hypoventilatory changes with accentuated bronchovascular markings   bilaterally. Cannot definitively exclude superimposed mild pulmonary   congestion.    Enlarged cardiac silhouette. Correlate clinically and with echocardiogram.    No evidence of focal airspace consolidation or sizable pleural effusions.    < end of copied text >              RADIOLOGY & ADDITIONAL STUDIES (The following images were personally reviewed):

## 2018-03-01 NOTE — PROGRESS NOTE ADULT - SUBJECTIVE AND OBJECTIVE BOX
Interval Events: Reviewed  Patient seen and examined at bedside.    Patient is a 77y old  Male who presents with a chief complaint of RUQ pain since 10 hours ago (26 Feb 2018 09:46)    he is better and is OOB in chair.  He slept on the cpap.  He did not get PT today  PAST MEDICAL & SURGICAL HISTORY:  Cholelithiasis  Arthritis  HLD (hyperlipidemia)  CAD (coronary artery disease)  BPH (benign prostatic hyperplasia)  COPD (chronic obstructive pulmonary disease)  CAD S/P percutaneous coronary angioplasty  H/O lithotripsy      MEDICATIONS:  Pulmonary:  ALBUTerol/ipratropium for Nebulization 3 milliLiter(s) Nebulizer every 6 hours  buDESOnide 160 MICROgram(s)/formoterol 4.5 MICROgram(s) Inhaler 2 Puff(s) Inhalation two times a day    Antimicrobials:  piperacillin/tazobactam IVPB. 3.375 Gram(s) IV Intermittent every 6 hours    Anticoagulants:  aspirin  chewable 81 milliGRAM(s) Oral daily  heparin  Injectable 7500 Unit(s) SubCutaneous every 8 hours    Cardiac:  losartan 100 milliGRAM(s) Oral daily  tamsulosin 0.4 milliGRAM(s) Oral at bedtime      Allergies    morphine (Unknown)    Intolerances        Vital Signs Last 24 Hrs  T(C): 36.6 (01 Mar 2018 16:30), Max: 36.8 (01 Mar 2018 13:57)  T(F): 97.9 (01 Mar 2018 16:30), Max: 98.2 (01 Mar 2018 13:57)  HR: 64 (01 Mar 2018 21:08) (54 - 106)  BP: 147/80 (01 Mar 2018 16:30) (103/52 - 147/80)  BP(mean): 83 (01 Mar 2018 12:31) (74 - 95)  RR: 18 (01 Mar 2018 16:30) (18 - 20)  SpO2: 92% (01 Mar 2018 21:08) (89% - 98%)    02-28 @ 07:01  -  03-01 @ 07:00  --------------------------------------------------------  IN: 400 mL / OUT: 475 mL / NET: -75 mL    03-01 @ 07:01 - 03-01 @ 22:10  --------------------------------------------------------  IN: 100 mL / OUT: 390 mL / NET: -290 mL          LABS:      CBC Full  -  ( 01 Mar 2018 06:10 )  WBC Count : 11.5 K/uL  Hemoglobin : 13.1 g/dL  Hematocrit : 43.6 %  Platelet Count - Automated : 244 K/uL  Mean Cell Volume : 96.7 fL  Mean Cell Hemoglobin : 29.0 pg  Mean Cell Hemoglobin Concentration : 30.0 g/dL  Auto Neutrophil # : x  Auto Lymphocyte # : x  Auto Monocyte # : x  Auto Eosinophil # : x  Auto Basophil # : x  Auto Neutrophil % : x  Auto Lymphocyte % : x  Auto Monocyte % : x  Auto Eosinophil % : x  Auto Basophil % : x    03-01    141  |  97  |  12  ----------------------------<  100<H>  3.7   |  37<H>  |  0.67    Ca    8.7      01 Mar 2018 06:09  Phos  2.2     03-01  Mg     2.0     03-01    TPro  6.7  /  Alb  3.0<L>  /  TBili  0.6  /  DBili  x   /  AST  30  /  ALT  53<H>  /  AlkPhos  80  03-01    cultures are negative to date  < from: TTE Echo w/Cont Complete (03.01.18 @ 12:06) >  EXAM:  ECHOCARDIOGRAM W CONTRAST                          PROCEDURE DATE:  03/01/2018                        INTERPRETATION:  Patient Height: 165.0 cm  Patient Weight: 108.0 kg  Heart Rate: 57 bpm  Systolic Pressure: 186 mmHg  Diastolic Pressure: 67 mmHg  BSA: 2.1 m^2  Interpretation Summary  The left atrial size is normal. Right atrial size is normal.There is mild   aortic valve thickening. No aortic regurgitation noted. No   hemodynamically   significant valvular aortic stenosis.  There is trivial mitral valve   thickening. No mitral regurgitation noted.Structurally normal tricuspid   valve.   No tricuspid regurgitation noted.The pulmonic valve is not well   visualized. No   pulmonic regurgitation noted.The right ventricle is normal in size and   function.There is mild concentric left ventricular hypertrophy. The left   ventricular wall motion is normal. The left ventricular ejection   fraction is   estimated to be 50-55%. Unable to determine diastolic function due to   suboptimal tissue doppler velocity.  No aortic root dilatation.There is   no   pericardial effusion.No prior study for comparison.    < end of copied text >                      RADIOLOGY & ADDITIONAL STUDIES (The following images were personally reviewed):  Mejia:                                     No  Urine output:                       adequate  DVT prophylaxis:                 Yes  Flattus:                                  Yes  Bowel movement:              No

## 2018-03-01 NOTE — PROGRESS NOTE ADULT - SUBJECTIVE AND OBJECTIVE BOX
ON: switched to CPAP for inc HR and desaturation to 89%  2/28: SOB transferred to tele, put on bipap, 20  IV Lasix + labetolol for SBP of 200, BNP-407        76 yo M with COPD (on 2L oxygen), SOURAV (on CPAP), HTN, CAD (s/p 3 stents and balloon angioplasty), BPH, p/w RUQ pain for 10 hours s/p perc jinny    - Pain/ Nausea control PRN  -CPAP while sleeping  - home meds  - FLD  - Zosyn  - sqh, scd, oob ON: switched to CPAP for inc HR and desaturation to 89%  2/28: SOB transferred to [facility], put on bipap, 20  IV Lasix + labetolol for SBP of 200, BNP-407      Vital Signs Last 24 Hrs  T(C): 36.4 (01 Mar 2018 05:06), Max: 36.8 (28 Feb 2018 21:00)  T(F): 97.5 (01 Mar 2018 05:06), Max: 98.3 (28 Feb 2018 21:00)  HR: 60 (01 Mar 2018 06:05) (54 - 106)  BP: 140/66 (01 Mar 2018 05:00) (114/56 - 195/87)  BP(mean): 95 (01 Mar 2018 05:00) (80 - 125)  RR: 20 (01 Mar 2018 06:05) (16 - 22)  SpO2: 90% (01 Mar 2018 06:05) (89% - 98%)      I&O's Summary    28 Feb 2018 07:01  -  01 Mar 2018 07:00  --------------------------------------------------------  IN: 400 mL / OUT: 475 mL / NET: -75 mL        Gen: NAD   Abd: soft, nt / nd   perc jinny drain in place       76 yo M with COPD (on 2L oxygen), SOURAV (on CPAP), HTN, CAD (s/p 3 stents and balloon angioplasty), BPH, p/w RUQ pain for 10 hours s/p perc jinny    - Pain/ Nausea control PRN  -CPAP while sleeping  - home meds  - FLD  - Zosyn  - sqh, scd, oob  - dispo planning

## 2018-03-02 ENCOUNTER — TRANSCRIPTION ENCOUNTER (OUTPATIENT)
Age: 78
End: 2018-03-02

## 2018-03-02 VITALS
TEMPERATURE: 98 F | SYSTOLIC BLOOD PRESSURE: 155 MMHG | DIASTOLIC BLOOD PRESSURE: 68 MMHG | HEART RATE: 68 BPM | RESPIRATION RATE: 16 BRPM | OXYGEN SATURATION: 90 %

## 2018-03-02 LAB
ANION GAP SERPL CALC-SCNC: 8 MMOL/L — SIGNIFICANT CHANGE UP (ref 5–17)
BUN SERPL-MCNC: 12 MG/DL — SIGNIFICANT CHANGE UP (ref 7–23)
CALCIUM SERPL-MCNC: 9.2 MG/DL — SIGNIFICANT CHANGE UP (ref 8.4–10.5)
CHLORIDE SERPL-SCNC: 96 MMOL/L — SIGNIFICANT CHANGE UP (ref 96–108)
CO2 SERPL-SCNC: 37 MMOL/L — HIGH (ref 22–31)
CREAT SERPL-MCNC: 0.75 MG/DL — SIGNIFICANT CHANGE UP (ref 0.5–1.3)
CULTURE RESULTS: NO GROWTH — SIGNIFICANT CHANGE UP
GLUCOSE SERPL-MCNC: 83 MG/DL — SIGNIFICANT CHANGE UP (ref 70–99)
HCT VFR BLD CALC: 46 % — SIGNIFICANT CHANGE UP (ref 39–50)
HGB BLD-MCNC: 13.9 G/DL — SIGNIFICANT CHANGE UP (ref 13–17)
MAGNESIUM SERPL-MCNC: 2 MG/DL — SIGNIFICANT CHANGE UP (ref 1.6–2.6)
MCHC RBC-ENTMCNC: 28.5 PG — SIGNIFICANT CHANGE UP (ref 27–34)
MCHC RBC-ENTMCNC: 30.2 G/DL — LOW (ref 32–36)
MCV RBC AUTO: 94.5 FL — SIGNIFICANT CHANGE UP (ref 80–100)
PHOSPHATE SERPL-MCNC: 2.4 MG/DL — LOW (ref 2.5–4.5)
PLATELET # BLD AUTO: 279 K/UL — SIGNIFICANT CHANGE UP (ref 150–400)
POTASSIUM SERPL-MCNC: 3.8 MMOL/L — SIGNIFICANT CHANGE UP (ref 3.5–5.3)
POTASSIUM SERPL-SCNC: 3.8 MMOL/L — SIGNIFICANT CHANGE UP (ref 3.5–5.3)
RBC # BLD: 4.87 M/UL — SIGNIFICANT CHANGE UP (ref 4.2–5.8)
RBC # FLD: 14.7 % — SIGNIFICANT CHANGE UP (ref 10.3–16.9)
SODIUM SERPL-SCNC: 141 MMOL/L — SIGNIFICANT CHANGE UP (ref 135–145)
SPECIMEN SOURCE: SIGNIFICANT CHANGE UP
WBC # BLD: 9 K/UL — SIGNIFICANT CHANGE UP (ref 3.8–10.5)
WBC # FLD AUTO: 9 K/UL — SIGNIFICANT CHANGE UP (ref 3.8–10.5)

## 2018-03-02 PROCEDURE — 78226 HEPATOBILIARY SYSTEM IMAGING: CPT

## 2018-03-02 PROCEDURE — 83690 ASSAY OF LIPASE: CPT

## 2018-03-02 PROCEDURE — 96375 TX/PRO/DX INJ NEW DRUG ADDON: CPT

## 2018-03-02 PROCEDURE — C1894: CPT

## 2018-03-02 PROCEDURE — 99285 EMERGENCY DEPT VISIT HI MDM: CPT | Mod: 25

## 2018-03-02 PROCEDURE — 87070 CULTURE OTHR SPECIMN AEROBIC: CPT

## 2018-03-02 PROCEDURE — 87205 SMEAR GRAM STAIN: CPT

## 2018-03-02 PROCEDURE — 99153 MOD SED SAME PHYS/QHP EA: CPT

## 2018-03-02 PROCEDURE — 99232 SBSQ HOSP IP/OBS MODERATE 35: CPT

## 2018-03-02 PROCEDURE — 36415 COLL VENOUS BLD VENIPUNCTURE: CPT

## 2018-03-02 PROCEDURE — C1769: CPT

## 2018-03-02 PROCEDURE — 84100 ASSAY OF PHOSPHORUS: CPT

## 2018-03-02 PROCEDURE — 83735 ASSAY OF MAGNESIUM: CPT

## 2018-03-02 PROCEDURE — 76705 ECHO EXAM OF ABDOMEN: CPT

## 2018-03-02 PROCEDURE — 83880 ASSAY OF NATRIURETIC PEPTIDE: CPT

## 2018-03-02 PROCEDURE — 87040 BLOOD CULTURE FOR BACTERIA: CPT

## 2018-03-02 PROCEDURE — 83605 ASSAY OF LACTIC ACID: CPT

## 2018-03-02 PROCEDURE — 99152 MOD SED SAME PHYS/QHP 5/>YRS: CPT

## 2018-03-02 PROCEDURE — 96374 THER/PROPH/DIAG INJ IV PUSH: CPT

## 2018-03-02 PROCEDURE — 97161 PT EVAL LOW COMPLEX 20 MIN: CPT

## 2018-03-02 PROCEDURE — 85610 PROTHROMBIN TIME: CPT

## 2018-03-02 PROCEDURE — C8929: CPT

## 2018-03-02 PROCEDURE — 47490 INCISION OF GALLBLADDER: CPT

## 2018-03-02 PROCEDURE — 94660 CPAP INITIATION&MGMT: CPT

## 2018-03-02 PROCEDURE — 85730 THROMBOPLASTIN TIME PARTIAL: CPT

## 2018-03-02 PROCEDURE — 85027 COMPLETE CBC AUTOMATED: CPT

## 2018-03-02 PROCEDURE — 99232 SBSQ HOSP IP/OBS MODERATE 35: CPT | Mod: GC

## 2018-03-02 PROCEDURE — 85025 COMPLETE CBC W/AUTO DIFF WBC: CPT

## 2018-03-02 PROCEDURE — 97530 THERAPEUTIC ACTIVITIES: CPT

## 2018-03-02 PROCEDURE — 80053 COMPREHEN METABOLIC PANEL: CPT

## 2018-03-02 PROCEDURE — 71045 X-RAY EXAM CHEST 1 VIEW: CPT

## 2018-03-02 PROCEDURE — 80048 BASIC METABOLIC PNL TOTAL CA: CPT

## 2018-03-02 PROCEDURE — A9537: CPT

## 2018-03-02 PROCEDURE — 87075 CULTR BACTERIA EXCEPT BLOOD: CPT

## 2018-03-02 PROCEDURE — 94640 AIRWAY INHALATION TREATMENT: CPT

## 2018-03-02 PROCEDURE — C1729: CPT

## 2018-03-02 PROCEDURE — 97116 GAIT TRAINING THERAPY: CPT

## 2018-03-02 RX ORDER — TIOTROPIUM BROMIDE 18 UG/1
1 CAPSULE ORAL; RESPIRATORY (INHALATION) DAILY
Refills: 0 | Status: DISCONTINUED | OUTPATIENT
Start: 2018-03-02 | End: 2018-03-02

## 2018-03-02 RX ORDER — ACETAMINOPHEN 500 MG
2 TABLET ORAL
Qty: 0 | Refills: 0 | DISCHARGE
Start: 2018-03-02

## 2018-03-02 RX ADMIN — LOSARTAN POTASSIUM 100 MILLIGRAM(S): 100 TABLET, FILM COATED ORAL at 06:18

## 2018-03-02 RX ADMIN — BUDESONIDE AND FORMOTEROL FUMARATE DIHYDRATE 2 PUFF(S): 160; 4.5 AEROSOL RESPIRATORY (INHALATION) at 06:17

## 2018-03-02 RX ADMIN — Medication 650 MILLIGRAM(S): at 15:45

## 2018-03-02 RX ADMIN — PANTOPRAZOLE SODIUM 40 MILLIGRAM(S): 20 TABLET, DELAYED RELEASE ORAL at 06:18

## 2018-03-02 RX ADMIN — PIPERACILLIN AND TAZOBACTAM 200 GRAM(S): 4; .5 INJECTION, POWDER, LYOPHILIZED, FOR SOLUTION INTRAVENOUS at 06:17

## 2018-03-02 RX ADMIN — HEPARIN SODIUM 7500 UNIT(S): 5000 INJECTION INTRAVENOUS; SUBCUTANEOUS at 06:18

## 2018-03-02 RX ADMIN — Medication 3 MILLILITER(S): at 06:16

## 2018-03-02 NOTE — PROGRESS NOTE ADULT - SUBJECTIVE AND OBJECTIVE BOX
Physical Medicine and Rehabilitation Progress Note:    Patient is a 77y old  Male who presents with a chief complaint of RUQ pain since 10 hours ago (26 Feb 2018 09:46)      HPI:  This is a 76 yo M with COPD (on 2L oxygen), SOURAV (on CPAP), HTN, CAD (s/p 3 stents and balloon angioplasty), BPH, p/w RUQ pain for 10 hours. The pain was described as 7/10 at RUQ and radiated to the back. +nausea, no vomiting, no chills/fever, not associated with meals. No SOB/chest pain or dizziness.     Never have colonoscopy before  Last meal was yesterday dinner  last plavix yesterday morning (26 Feb 2018 09:46)                            13.9   9.0   )-----------( 279      ( 02 Mar 2018 07:17 )             46.0       03-02    141  |  96  |  12  ----------------------------<  83  3.8   |  37<H>  |  0.75    Ca    9.2      02 Mar 2018 07:17  Phos  2.4     03-02  Mg     2.0     03-02    TPro  6.7  /  Alb  3.0<L>  /  TBili  0.6  /  DBili  x   /  AST  30  /  ALT  53<H>  /  AlkPhos  80  03-01    Vital Signs Last 24 Hrs  T(C): 37.1 (02 Mar 2018 08:52), Max: 37.1 (02 Mar 2018 08:52)  T(F): 98.7 (02 Mar 2018 08:52), Max: 98.7 (02 Mar 2018 08:52)  HR: 81 (02 Mar 2018 08:52) (58 - 81)  BP: 147/87 (02 Mar 2018 08:52) (121/58 - 159/94)  BP(mean): 83 (01 Mar 2018 12:31) (83 - 83)  RR: 17 (02 Mar 2018 08:52) (16 - 18)  SpO2: 93% (02 Mar 2018 08:52) (90% - 97%)    MEDICATIONS  (STANDING):  ALBUTerol/ipratropium for Nebulization 3 milliLiter(s) Nebulizer every 6 hours  aspirin  chewable 81 milliGRAM(s) Oral daily  atorvastatin 40 milliGRAM(s) Oral at bedtime  buDESOnide 160 MICROgram(s)/formoterol 4.5 MICROgram(s) Inhaler 2 Puff(s) Inhalation two times a day  heparin  Injectable 7500 Unit(s) SubCutaneous every 8 hours  losartan 100 milliGRAM(s) Oral daily  pantoprazole    Tablet 40 milliGRAM(s) Oral before breakfast  piperacillin/tazobactam IVPB. 3.375 Gram(s) IV Intermittent every 6 hours  tamsulosin 0.4 milliGRAM(s) Oral at bedtime  tiotropium 18 MICROgram(s) Capsule 1 Capsule(s) Inhalation daily    MEDICATIONS  (PRN):  acetaminophen   Tablet. 650 milliGRAM(s) Oral every 6 hours PRN Moderate Pain (4 - 6)  acetaminophen   Tablet. 975 milliGRAM(s) Oral every 6 hours PRN Severe Pain (7 - 10)  ondansetron Injectable 4 milliGRAM(s) IV Push every 6 hours PRN Nausea    Currently Undergoing Physical Therapy at bedside.    Initial Functional Status Assessment:    Previous Level of Function:     · Ambulation Skills	independent; without assistive device	  · Transfer Skills	independent	  · ADL Skills	independent	  · Work/Leisure Activity	n/a	  · Additional Comments	Patient reports independence with all ADLs/IADLs prior to admission. No HHA. Denies history of mechanical falls.	    Cognitive Status Examination:   · Orientation	oriented to person, place, time and situation	  · Level of Consciousness	alert	  · Follows Commands and Answers Questions	100% of the time	  · Personal Safety and Judgment	intact	    Skin:   Skin:  · Skin Integrity	(R) abdominal drain dressing minimally removed pre- treatment, DADYAY Hu made aware	    Range of Motion Exam:   · Active Range of Motion Examination	bilateral upper extremity Active ROM was WFL (within functional limits); bilateral  lower extremity Active ROM was WFL (within functional limits)	    Manual Muscle Testing:   · Manual Muscle Testing Results	Grossly assessed with functional movement, bilateral UE/LE greater than or equal to 3+/5	    Bed Mobility: Scooting/Bridging:     · Level of [place]	contact guard	  · Physical Assist/Nonphysical Assist	verbal cues; scooting to edge of bed in sitting	    Bed Mobility: Sit to Supine:     · Level of [place]	minimum assist (75% patients effort)	  · Physical Assist/Nonphysical Assist	1 person assist; verbal cues; fair eccentric trunk control; assist for B/L LEs onto bed surface	    Bed Mobility: Supine to Sit:     · Level of [place]	minimum assist (75% patients effort)	  · Physical Assist/Nonphysical Assist	1 person assist; verbal cues; able to bring B/L LEs to edge of bed with VCs, increased assist for trunk mobility; **Dangled EOB x 5 minutes with +emesis, RN Fritz guzman, RN informed Team 4	  · Assistive Device	HOB elevated 30 degrees	    Bed Mobility Analysis:     · Impairments Contributing to Impaired Bed Mobility	impaired balance; decreased strength; dizziness	    Transfer: Sit to Stand:     · Level of [place]	contact guard	  · Physical Assist/Nonphysical Assist	verbal cues; slightly unsteady; performed 2 squat-pivots towards head of bed prior to returning to supine	  · Assistive Device	hand-held assist	    Transfer: Stand to Sit:     · Level of [place]	contact guard	  · Physical Assist/Nonphysical Assist	verbal cues	  · Assistive Device	hand-held assist	    Gait Skills:     · Level of [place]	to be assessed	    Balance Skills Assessment:     · Sitting Balance: Static	independent	  · Sitting Balance: Dynamic	supervision	  · Sit-to-Stand Balance	contact guard	  · Identified Impairments Contributing to Balance Disturbance	decreased strength; dizziness	    Sensory Examination:   Sensory Examination:    Grossly Intact:   · Gross Sensory Examination	Grossly Intact; Left UE; Right UE; Left LE; Right LE; Head/Neck; Trunk	      Clinical Impressions:   · Criteria for Skilled Therapeutic Interventions	impairments found; functional limitations in following categories; rehab potential; therapy frequency	  · Impairments Found (describe specific impairments)	aerobic capacity/endurance; gait, locomotion, and balance; muscle strength	  · Functional Limitations in Following Categories (describe specific limitations)	self-care; home management	  · Rehab Potential	good, to achieve stated therapy goals	  · Therapy Frequency	2-3x/week; Patient educated on frequency of therapy, patient verbalized understanding.	      PM&R Impression: as above    Disposition Plan Recommendations:  d/c home

## 2018-03-02 NOTE — PROGRESS NOTE ADULT - PROBLEM SELECTOR PLAN 2
No CP.  Remote stents, continue ASA and statin
he was compliant with the full face mask and used it while was asleep during the day
he was compliant with the full face mask and used it while was asleep during the day.  His menta; status improved.  He tolerated the cpap.
he was compliant with the full face mask and used it while was asleep during the day.  His menta; status improved.  He tolerated the cpap.

## 2018-03-02 NOTE — DISCHARGE NOTE ADULT - PLAN OF CARE
follow up follow up in the office with dr. barrett. call to schedule an appointment in the office. drain will remain in place until determined by dr. barrett. follow up in the office with dr. keating (pulmonologist). nurse will come to help with drain management. the patient only requires gauze and tape around the drain.

## 2018-03-02 NOTE — PROGRESS NOTE ADULT - PROBLEM SELECTOR PLAN 1
SOB.  CXR not helpful due to poor inspiration. I suspect either COPD/asthma, CHF or respiratory depression from Narcotics which have been stopped.  BNP mildly elevated.  Will check an EKG, echo ordered.  Consider lasix x 1 today.  Continue bronchodilators as he has a prolonged expiration.
SOB.  CXR not helpful due to poor inspiration. I suspect either COPD/asthma, CHF or respiratory depression from Narcotics which have been stopped.  BNP mildly elevated.  Will check an EKG, echo ordered.  EKG not posted, echo is normal. Consider lasix x 1 today.  Continue bronchodilators as he has a prolonged expiration.
Continue statin
he is atrovent proventil and pulmicort.  Continue on the nebs for now.  No need for systemic steroids.  He improved pulmonary wise.  Trial of hand held inhalers in the AM.  Recommend symbicort and Spiriva. with as ended albuterol
he is atrovent proventil and pulmicort.  Continue on the nebs for now.  No need for systemic steroids.  I discussed with the nurse to give him a stat dose now
he is atrovent proventil and pulmicort.  Continue on the nebs for now.  No need for systemic steroids.  He improved pulmonary wise.  Trial of hand held inhalers in the AM.  Recommend symbicort and Spiriva. with as ended albuterol

## 2018-03-02 NOTE — DISCHARGE NOTE ADULT - PATIENT PORTAL LINK FT
You can access the [facility] Patient Portal, offered by [facility], by registering with the following website: [email or web address]

## 2018-03-02 NOTE — PROGRESS NOTE ADULT - PROBLEM SELECTOR PLAN 3
On Losartan
he desaturates on room air.  He is on 2 l/min NC and accept moderate hypoxemia.  His saturation improved and to continue on 2l/min  He is at risk for atelectasis due to truncal obesity and cholecystitis.  IS and OOB. ECHO did not reveal PAH
he desaturates on room air.  He is on 2 l/min NC and accept moderate hypoxemia.    He is at risk for atelectasis due to truncal obesity and cholecystitis.  IS and OOB.  PT consult
he desaturates on room air.  He is on 2 l/min NC and accept moderate hypoxemia.  His saturation improved and to continue on 2l/min  He is at risk for atelectasis due to truncal obesity and cholecystitis.  IS and OOB. ECHO did not reveal PAH

## 2018-03-02 NOTE — DISCHARGE NOTE ADULT - MEDICATION SUMMARY - MEDICATIONS TO TAKE
I will START or STAY ON the medications listed below when I get home from the hospital:    aspirin 81 mg oral tablet  -- 1 tab(s) by mouth once a day  -- Indication: For Home med    acetaminophen 325 mg oral tablet  -- 2 tab(s) by mouth every 6 hours, As needed, Moderate Pain (4 - 6)  -- Indication: For Home med    Cozaar  -- 125 milligram(s) by mouth once a day  -- Indication: For Home med    Flomax  -- 0.4 milligram(s) by mouth once a day (at bedtime)  -- Indication: For Home med    Crestor  -- 10 milligram(s) by mouth once a day  -- Indication: For Home med    Plavix 75 mg oral tablet  -- 75 milligram(s) by mouth once a day  -- Indication: For Home med    albuterol  -- 1  inhaled 2 times a day  -- Indication: For Home med

## 2018-03-02 NOTE — DISCHARGE NOTE ADULT - CARE PLAN
Principal Discharge DX:	Cholelithiasis  Goal:	follow up  Assessment and plan of treatment:	follow up in the office with dr. barrett. call to schedule an appointment in the office. drain will remain in place until determined by dr. barrett. follow up in the office with dr. keating (pulmonologist). nurse will come to help with drain management. the patient only requires gauze and tape around the drain.

## 2018-03-02 NOTE — DISCHARGE NOTE ADULT - HOSPITAL COURSE
78 yo M with COPD (on 2L oxygen), SOURAV (on CPAP), HTN, CAD (s/p 3 stents and balloon angioplasty), BPH, p/w RUQ pain s/p perc jinny. discharged home in stable condition with drain in place.

## 2018-03-02 NOTE — PROGRESS NOTE ADULT - ASSESSMENT
78 yo M with COPD (on 2L oxygen), SOURAV (on CPAP), HTN, CAD (s/p 3 stents and balloon angioplasty), BPH, p/w RUQ pain for 10 hours s/p perc jinny
76 yo M with COPD (on 2L oxygen), SOURAV (on CPAP), HTN, CAD (s/p 3 stents and balloon angioplasty), BPH, p/w RUQ pain for 10 hours    - Pain/ Nausea control PRN  - home meds  - IVF  - CLD  - Zosyn  - sqh, scd, oob
78 yo M with COPD (on 2L oxygen), SOURAV (on CPAP), HTN, CAD (s/p 3 stents and balloon angioplasty), BPH, p/w RUQ pain for 10 hours    - Pain/ Nausea control PRN  - home meds  - IVF  - NPO for possible IR-PERC KANWAL  - Zosyn  - sqh, scd, oob

## 2018-03-02 NOTE — PROGRESS NOTE ADULT - RS GEN PE MLT RESP DETAILS PC
chest wall tenderness/diminished breath sounds, L/wheezes
diminished breath sounds, L/diminished breath sounds, R

## 2018-03-02 NOTE — PROGRESS NOTE ADULT - PROVIDER SPECIALTY LIST ADULT
Surgery
Surgery
Cardiology
Surgery
Surgery
Rehab Medicine
Cardiology
Cardiology
Pulmonology

## 2018-03-02 NOTE — PROGRESS NOTE ADULT - ATTENDING COMMENTS
Patient seen and examined with house-staff during bedside rounds.  Resident note read, including vitals, physical findings, laboratory data, and radiological reports.   Revisions included below.  Direct personal management at bed side and extensive interpretation of the data.  Plan was outlined and discussed in details with the housestaff.  Decision making of high complexity  Action taken for acute disease activity:  -managing the bronchodilators and evaluating the patient to change to MDI  - evaluating progress in pulmonary status which is marginal  -evaluating to discontinue monitor bed  - evaluating  titrating oxygen supplementation
Patient seen and examined with house-staff during bedside rounds.  Resident note read, including vitals, physical findings, laboratory data, and radiological reports.   Revisions included below.  Direct personal management at bed side and extensive interpretation of the data.  Plan was outlined and discussed in details with the housestaff.  Decision making of high complexity  Action taken for acute disease activity:  -managing the bronchodilators and evaluating the patient to change to MDI. He will contact his primary pulmonologist  - evaluating progress in pulmonary status which is marginal but stable for DC  -evaluating to discontinue monitor bed  - evaluating  titrating oxygen supplementation
Patient seen and examined with house-staff during bedside rounds.  Resident note read, including vitals, physical findings, laboratory data, and radiological reports.   Revisions included below.  Direct personal management at bed side and extensive interpretation of the data.  Plan was outlined and discussed in details with the housestaff.  Decision making of high complexity  I discussed with Dr Davis and family.

## 2018-03-02 NOTE — PROGRESS NOTE ADULT - PROBLEM SELECTOR PROBLEM 1
Chronic obstructive pulmonary disease, unspecified COPD type
Chronic obstructive pulmonary disease, unspecified COPD type
Pure hypercholesterolemia
Chronic obstructive pulmonary disease, unspecified COPD type

## 2018-03-02 NOTE — DISCHARGE NOTE ADULT - CARE PROVIDER_API CALL
Francisco Davis), Surgery  [address] [place] [address]  Phone: [phone]  Fax: [phone]

## 2018-03-02 NOTE — PROGRESS NOTE ADULT - SUBJECTIVE AND OBJECTIVE BOX
O/N: SAMPSON  3/1: TTE w/ normal EF, zuri to see       76 yo M with COPD (on 2L oxygen), SOURAV (on CPAP), HTN, CAD (s/p 3 stents and balloon angioplasty), BPH, p/w RUQ pain for 10 hours s/p perc jinny    - Pain/ Nausea control PRN  -CPAP while sleeping  - home meds  - FLD  - Zosyn  - sqh, scd, oob O/N: SAMPSON  3/1: TTE w/ normal EF, zuri to see     Vital Signs Last 24 Hrs  T(C): 36.9 (02 Mar 2018 06:01), Max: 36.9 (01 Mar 2018 21:15)  T(F): 98.5 (02 Mar 2018 06:01), Max: 98.5 (01 Mar 2018 21:15)  HR: 64 (02 Mar 2018 06:04) (58 - 67)  BP: 128/65 (02 Mar 2018 06:01) (103/52 - 159/94)  BP(mean): 83 (01 Mar 2018 12:31) (74 - 83)  RR: 16 (02 Mar 2018 06:01) (16 - 18)  SpO2: 95% (02 Mar 2018 06:04) (90% - 97%)    I&O's Summary    01 Mar 2018 07:01  -  02 Mar 2018 07:00  --------------------------------------------------------  IN: 100 mL / OUT: 450 mL / NET: -350 mL      Gen: NAD  Abd: soft, nt / nd   drain in palce     78 yo M with COPD (on 2L oxygen), SOURAV (on CPAP), HTN, CAD (s/p 3 stents and balloon angioplasty), BPH, p/w RUQ pain for 10 hours s/p perc jinny    - Pain/ Nausea control PRN  -CPAP while sleeping  - home meds  - FLD  - Zosyn  - sqh, scd, oob  - d/c home

## 2018-03-02 NOTE — PROGRESS NOTE ADULT - SUBJECTIVE AND OBJECTIVE BOX
INTERVAL HISTORY:  resp status better  using CPAP  	  MEDICATIONS:  losartan 100 milliGRAM(s) Oral daily  tamsulosin 0.4 milliGRAM(s) Oral at bedtime    piperacillin/tazobactam IVPB. 3.375 Gram(s) IV Intermittent every 6 hours    ALBUTerol/ipratropium for Nebulization 3 milliLiter(s) Nebulizer every 6 hours  buDESOnide 160 MICROgram(s)/formoterol 4.5 MICROgram(s) Inhaler 2 Puff(s) Inhalation two times a day    acetaminophen   Tablet. 650 milliGRAM(s) Oral every 6 hours PRN  acetaminophen   Tablet. 975 milliGRAM(s) Oral every 6 hours PRN  ondansetron Injectable 4 milliGRAM(s) IV Push every 6 hours PRN    pantoprazole    Tablet 40 milliGRAM(s) Oral before breakfast    atorvastatin 40 milliGRAM(s) Oral at bedtime    aspirin  chewable 81 milliGRAM(s) Oral daily  heparin  Injectable 7500 Unit(s) SubCutaneous every 8 hours        PHYSICAL EXAM:  T(C): 36.9 (03-02-18 @ 06:01), Max: 36.9 (03-01-18 @ 21:15)  HR: 64 (03-02-18 @ 06:04) (58 - 67)  BP: 128/65 (03-02-18 @ 06:01) (103/52 - 159/94)  RR: 16 (03-02-18 @ 06:01) (16 - 18)  SpO2: 95% (03-02-18 @ 06:04) (90% - 97%)  Wt(kg): --  I&O's Summary    01 Mar 2018 07:01  -  02 Mar 2018 07:00  --------------------------------------------------------  IN: 100 mL / OUT: 450 mL / NET: -350 mL          Appearance: Normal	  HEENT:   Normal oral mucosa, PERRL, EOMI	  Lymphatic: No lymphadenopathy  Cardiovascular: Normal S1 S2, No JVD, No murmurs, trace edema  Respiratory: Lungs clear to auscultation	  Psychiatry: A & O x 3, Mood & affect appropriate  Gastrointestinal:  Soft, Non-tender, + BS	  Skin: No rashes, No ecchymoses, No cyanosis  Neurologic: Non-focal  Extremities: Normal range of motion, No clubbing, cyanosis, trace edema  Vascular: Peripheral pulses palpable 2+ bilaterally    TELEMETRY: 	    ECG:  	  RADIOLOGY:   DIAGNOSTIC TESTING:  [ ] Echocardiogram: EF 50-55%, NL valves  [ ]  Catheterization:  [ ] Stress Test:    OTHER: 	    LABS:	 	    CARDIAC MARKERS:                                  13.9   9.0   )-----------( 279      ( 02 Mar 2018 07:17 )             46.0     03-01    141  |  97  |  12  ----------------------------<  100<H>  3.7   |  37<H>  |  0.67    Ca    8.7      01 Mar 2018 06:09  Phos  2.2     03-01  Mg     2.0     03-01    TPro  6.7  /  Alb  3.0<L>  /  TBili  0.6  /  DBili  x   /  AST  30  /  ALT  53<H>  /  AlkPhos  80  03-01    proBNP:   Lipid Profile:   HgA1c:   TSH:     ASSESSMENT/PLAN:

## 2018-03-02 NOTE — PROGRESS NOTE ADULT - PROBLEM SELECTOR PROBLEM 4
Pure hypercholesterolemia
Chronic obstructive pulmonary disease, unspecified COPD type
Pure hypercholesterolemia

## 2018-03-03 LAB
CULTURE RESULTS: SIGNIFICANT CHANGE UP
CULTURE RESULTS: SIGNIFICANT CHANGE UP
SPECIMEN SOURCE: SIGNIFICANT CHANGE UP
SPECIMEN SOURCE: SIGNIFICANT CHANGE UP

## 2018-03-06 DIAGNOSIS — I25.10 ATHEROSCLEROTIC HEART DISEASE OF NATIVE CORONARY ARTERY WITHOUT ANGINA PECTORIS: ICD-10-CM

## 2018-03-06 DIAGNOSIS — E78.00 PURE HYPERCHOLESTEROLEMIA, UNSPECIFIED: ICD-10-CM

## 2018-03-06 DIAGNOSIS — Z99.81 DEPENDENCE ON SUPPLEMENTAL OXYGEN: ICD-10-CM

## 2018-03-06 DIAGNOSIS — K80.20 CALCULUS OF GALLBLADDER WITHOUT CHOLECYSTITIS WITHOUT OBSTRUCTION: ICD-10-CM

## 2018-03-06 DIAGNOSIS — Z95.5 PRESENCE OF CORONARY ANGIOPLASTY IMPLANT AND GRAFT: ICD-10-CM

## 2018-03-06 DIAGNOSIS — J44.9 CHRONIC OBSTRUCTIVE PULMONARY DISEASE, UNSPECIFIED: ICD-10-CM

## 2018-03-06 DIAGNOSIS — E66.9 OBESITY, UNSPECIFIED: ICD-10-CM

## 2018-03-06 DIAGNOSIS — K76.0 FATTY (CHANGE OF) LIVER, NOT ELSEWHERE CLASSIFIED: ICD-10-CM

## 2018-03-06 DIAGNOSIS — G47.33 OBSTRUCTIVE SLEEP APNEA (ADULT) (PEDIATRIC): ICD-10-CM

## 2018-03-06 DIAGNOSIS — N40.0 BENIGN PROSTATIC HYPERPLASIA WITHOUT LOWER URINARY TRACT SYMPTOMS: ICD-10-CM

## 2018-03-06 DIAGNOSIS — J98.11 ATELECTASIS: ICD-10-CM

## 2018-03-06 DIAGNOSIS — M19.90 UNSPECIFIED OSTEOARTHRITIS, UNSPECIFIED SITE: ICD-10-CM

## 2018-03-06 DIAGNOSIS — I10 ESSENTIAL (PRIMARY) HYPERTENSION: ICD-10-CM

## 2018-03-06 DIAGNOSIS — R09.02 HYPOXEMIA: ICD-10-CM

## 2018-03-06 DIAGNOSIS — K80.00 CALCULUS OF GALLBLADDER WITH ACUTE CHOLECYSTITIS WITHOUT OBSTRUCTION: ICD-10-CM

## 2018-03-06 DIAGNOSIS — T40.605A ADVERSE EFFECT OF UNSPECIFIED NARCOTICS, INITIAL ENCOUNTER: ICD-10-CM

## 2018-03-06 DIAGNOSIS — Z95.1 PRESENCE OF AORTOCORONARY BYPASS GRAFT: ICD-10-CM

## 2018-03-06 DIAGNOSIS — Z79.82 LONG TERM (CURRENT) USE OF ASPIRIN: ICD-10-CM

## 2018-03-06 DIAGNOSIS — Z79.01 LONG TERM (CURRENT) USE OF ANTICOAGULANTS: ICD-10-CM

## 2018-03-18 PROBLEM — Z00.00 ENCOUNTER FOR PREVENTIVE HEALTH EXAMINATION: Status: ACTIVE | Noted: 2018-03-18

## 2018-03-23 ENCOUNTER — OUTPATIENT (OUTPATIENT)
Dept: OUTPATIENT SERVICES | Facility: HOSPITAL | Age: 78
LOS: 1 days | End: 2018-03-23
Payer: MEDICARE

## 2018-03-23 ENCOUNTER — APPOINTMENT (OUTPATIENT)
Dept: INTERVENTIONAL RADIOLOGY/VASCULAR | Facility: HOSPITAL | Age: 78
End: 2018-03-23
Payer: MEDICARE

## 2018-03-23 DIAGNOSIS — K80.20 CALCULUS OF GALLBLADDER WITHOUT CHOLECYSTITIS WITHOUT OBSTRUCTION: ICD-10-CM

## 2018-03-23 DIAGNOSIS — Z98.890 OTHER SPECIFIED POSTPROCEDURAL STATES: Chronic | ICD-10-CM

## 2018-03-23 DIAGNOSIS — I25.10 ATHEROSCLEROTIC HEART DISEASE OF NATIVE CORONARY ARTERY WITHOUT ANGINA PECTORIS: Chronic | ICD-10-CM

## 2018-03-23 PROCEDURE — 47531 INJECTION FOR CHOLANGIOGRAM: CPT

## 2018-04-04 ENCOUNTER — APPOINTMENT (OUTPATIENT)
Dept: PULMONOLOGY | Facility: CLINIC | Age: 78
End: 2018-04-04
Payer: MEDICARE

## 2018-04-04 VITALS
SYSTOLIC BLOOD PRESSURE: 158 MMHG | HEIGHT: 63 IN | WEIGHT: 225 LBS | HEART RATE: 62 BPM | BODY MASS INDEX: 39.87 KG/M2 | OXYGEN SATURATION: 88 % | DIASTOLIC BLOOD PRESSURE: 81 MMHG

## 2018-04-04 PROCEDURE — 99214 OFFICE O/P EST MOD 30 MIN: CPT | Mod: 25

## 2018-04-04 PROCEDURE — 94729 DIFFUSING CAPACITY: CPT

## 2018-04-04 PROCEDURE — 94060 EVALUATION OF WHEEZING: CPT

## 2018-04-04 PROCEDURE — 94727 GAS DIL/WSHOT DETER LNG VOL: CPT

## 2018-04-04 RX ORDER — ERYTHROMYCIN 5 MG/G
5 OINTMENT OPHTHALMIC
Qty: 3 | Refills: 0 | Status: DISCONTINUED | COMMUNITY
Start: 2017-04-27

## 2018-04-04 RX ORDER — AZITHROMYCIN 250 MG/1
250 TABLET, FILM COATED ORAL
Qty: 6 | Refills: 0 | Status: DISCONTINUED | COMMUNITY
Start: 2016-10-12

## 2018-04-04 RX ORDER — PREDNISOLONE ACETATE 10 MG/ML
1 SUSPENSION/ DROPS OPHTHALMIC
Qty: 15 | Refills: 0 | Status: COMPLETED | COMMUNITY
Start: 2017-05-24

## 2018-04-04 RX ORDER — CLOTRIMAZOLE AND BETAMETHASONE DIPROPIONATE 10; .5 MG/G; MG/G
1-0.05 CREAM TOPICAL
Qty: 15 | Refills: 0 | Status: DISCONTINUED | COMMUNITY
Start: 2016-07-28

## 2018-04-04 RX ORDER — NAPROXEN 500 MG/1
500 TABLET ORAL
Qty: 20 | Refills: 0 | Status: COMPLETED | COMMUNITY
Start: 2017-09-15

## 2018-04-04 RX ORDER — SUCRALFATE 1 G/1
1 TABLET ORAL
Qty: 60 | Refills: 0 | Status: COMPLETED | COMMUNITY
Start: 2017-05-22

## 2018-04-09 ENCOUNTER — APPOINTMENT (OUTPATIENT)
Dept: PULMONOLOGY | Facility: CLINIC | Age: 78
End: 2018-04-09

## 2018-06-20 ENCOUNTER — APPOINTMENT (OUTPATIENT)
Dept: PULMONOLOGY | Facility: CLINIC | Age: 78
End: 2018-06-20
Payer: MEDICARE

## 2018-06-20 VITALS
BODY MASS INDEX: 40.75 KG/M2 | HEART RATE: 70 BPM | OXYGEN SATURATION: 89 % | HEIGHT: 63 IN | WEIGHT: 230 LBS | SYSTOLIC BLOOD PRESSURE: 124 MMHG | DIASTOLIC BLOOD PRESSURE: 76 MMHG

## 2018-06-20 PROCEDURE — 99214 OFFICE O/P EST MOD 30 MIN: CPT

## 2018-09-19 ENCOUNTER — APPOINTMENT (OUTPATIENT)
Dept: PULMONOLOGY | Facility: CLINIC | Age: 78
End: 2018-09-19
Payer: MEDICARE

## 2018-09-19 VITALS
HEIGHT: 63 IN | DIASTOLIC BLOOD PRESSURE: 75 MMHG | WEIGHT: 230 LBS | SYSTOLIC BLOOD PRESSURE: 139 MMHG | HEART RATE: 80 BPM | BODY MASS INDEX: 40.75 KG/M2 | OXYGEN SATURATION: 65 %

## 2018-09-19 PROCEDURE — 99214 OFFICE O/P EST MOD 30 MIN: CPT

## 2019-05-01 ENCOUNTER — APPOINTMENT (OUTPATIENT)
Dept: PULMONOLOGY | Facility: CLINIC | Age: 79
End: 2019-05-01
Payer: MEDICARE

## 2019-05-01 VITALS
SYSTOLIC BLOOD PRESSURE: 158 MMHG | HEART RATE: 74 BPM | HEIGHT: 63 IN | OXYGEN SATURATION: 87 % | WEIGHT: 245 LBS | BODY MASS INDEX: 43.41 KG/M2 | DIASTOLIC BLOOD PRESSURE: 69 MMHG

## 2019-05-01 PROCEDURE — 99214 OFFICE O/P EST MOD 30 MIN: CPT

## 2019-05-01 NOTE — HISTORY OF PRESENT ILLNESS
[FreeTextEntry1] : 78 yo male with hx of OAD and SOURAV presents for follow up. The patient complains of FIGUEROA without chest pain or hemoptysis. He is concerned over weight gain which he blames on meds, including prednisone which he has used since last year after gall bladder surgery at [facility]!. Presently he is on 4 mg daily.\par He uses advair BID with nebulized bronchodilators and supplemental oxygen with PAP at night.

## 2019-05-01 NOTE — DISCUSSION/SUMMARY
[FreeTextEntry1] : 80 yo male OAD at baseline. He is to continue advair BID as before with PRN nebulized bronchodilators. The need for continued use of oral steroids is not clear. He is to follow up with his [facility] doctors regarding need for continued use.\par Compliance with CPAP use was stressed. He is to follow up with his PMD as before.

## 2019-05-01 NOTE — CONSULT LETTER
[Dear  ___] : Dear  [unfilled], [Please see my note below.] : Please see my note below. [Consult Closing:] : Thank you very much for allowing me to participate in the care of this patient.  If you have any questions, please do not hesitate to contact me. [Courtesy Letter:] : I had the pleasure of seeing your patient, [unfilled], in my office today. [Sincerely,] : Sincerely, [DrRonnie  ___] : Dr. VALDERRAMA

## 2019-05-01 NOTE — REVIEW OF SYSTEMS
[Fatigue] : fatigue [Recent Wt Gain (___ Lbs)] : no recent weight gain [As Noted in HPI] : as noted in HPI [Cough] : no cough [Sputum] : not coughing up ~M sputum [Hemoptysis] : no hemoptysis [Dyspnea] : dyspnea [Wheezing] : no wheezing [Hypertension] : ~T hypertension [Edema] : ~T edema was not present [Snoring] : snoring [Orthopnea] : no orthopnea [Hypersomnolence] : sleeping much more than usual [Negative] : Psychiatric

## 2019-05-01 NOTE — PHYSICAL EXAM
[No Deformities] : no deformities [General Appearance - In No Acute Distress] : no acute distress [Well Groomed] : well groomed [Eyelids - No Xanthelasma] : the eyelids demonstrated no xanthelasmas [FreeTextEntry1] : Obese [Normal Conjunctiva] : the conjunctiva exhibited no abnormalities [Jugular Venous Distention Increased] : there was no jugular-venous distention [Neck Appearance] : the appearance of the neck was normal [Neck Cervical Mass (___cm)] : no neck mass was observed [Heart Rate And Rhythm] : heart rate and rhythm were normal [Thyroid Nodule] : there were no palpable thyroid nodules [Thyroid Diffuse Enlargement] : the thyroid was not enlarged [Murmurs] : no murmurs present [Heart Sounds] : normal S1 and S2 [Exaggerated Use Of Accessory Muscles For Inspiration] : no accessory muscle use [Decreased Breath Sounds] : decreased breath sounds [Bowel Sounds] : normal bowel sounds [Abdomen Soft] : soft [Abdomen Tenderness] : non-tender [Cyanosis, Localized] : no localized cyanosis [Nail Clubbing] : no clubbing of the fingernails [Petechial Hemorrhages (___cm)] : no petechial hemorrhages [Skin Color & Pigmentation] : normal skin color and pigmentation [Skin Lesions] : no skin lesions [] : no rash [No Venous Stasis] : no venous stasis [No Xanthoma] : no  xanthoma was observed [No Skin Ulcers] : no skin ulcer [No Focal Deficits] : no focal deficits [Oriented To Time, Place, And Person] : oriented to person, place, and time [Impaired Insight] : insight and judgment were intact [Affect] : the affect was normal

## 2019-06-17 ENCOUNTER — APPOINTMENT (OUTPATIENT)
Dept: PULMONOLOGY | Facility: CLINIC | Age: 79
End: 2019-06-17
Payer: MEDICARE

## 2019-06-17 VITALS
DIASTOLIC BLOOD PRESSURE: 65 MMHG | RESPIRATION RATE: 24 BRPM | OXYGEN SATURATION: 88 % | HEART RATE: 70 BPM | HEIGHT: 63 IN | BODY MASS INDEX: 43.23 KG/M2 | SYSTOLIC BLOOD PRESSURE: 110 MMHG | WEIGHT: 244 LBS

## 2019-06-17 PROCEDURE — 99214 OFFICE O/P EST MOD 30 MIN: CPT

## 2019-06-17 NOTE — HISTORY OF PRESENT ILLNESS
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 The patient was recently admitted to [facility] with respiratory failure (hypoxemic/hypercapnic), intubated for 3 days. Prior to admission he was treated for neuropathy with valium and gabapentin. He was discharged on continuous supplemental O2 , increased to 4 l/m.

## 2019-06-17 NOTE — PHYSICAL EXAM
[Well Groomed] : well groomed [No Deformities] : no deformities [General Appearance - In No Acute Distress] : no acute distress [Normal Conjunctiva] : the conjunctiva exhibited no abnormalities [Eyelids - No Xanthelasma] : the eyelids demonstrated no xanthelasmas [Neck Cervical Mass (___cm)] : no neck mass was observed [Neck Appearance] : the appearance of the neck was normal [Thyroid Diffuse Enlargement] : the thyroid was not enlarged [Thyroid Nodule] : there were no palpable thyroid nodules [Jugular Venous Distention Increased] : there was no jugular-venous distention [Heart Rate And Rhythm] : heart rate and rhythm were normal [Heart Sounds] : normal S1 and S2 [Murmurs] : no murmurs present [Decreased Breath Sounds] : decreased breath sounds [Exaggerated Use Of Accessory Muscles For Inspiration] : no accessory muscle use [Abdomen Tenderness] : non-tender [Abdomen Soft] : soft [Bowel Sounds] : normal bowel sounds [Nail Clubbing] : no clubbing of the fingernails [Cyanosis, Localized] : no localized cyanosis [Petechial Hemorrhages (___cm)] : no petechial hemorrhages [1+ Pitting] : 1+  pitting [Skin Color & Pigmentation] : normal skin color and pigmentation [No Venous Stasis] : no venous stasis [Skin Lesions] : no skin lesions [] : no rash [No Focal Deficits] : no focal deficits [No Skin Ulcers] : no skin ulcer [No Xanthoma] : no  xanthoma was observed [Oriented To Time, Place, And Person] : oriented to person, place, and time [Affect] : the affect was normal [Impaired Insight] : insight and judgment were intact [FreeTextEntry1] : Obese

## 2019-06-17 NOTE — DISCUSSION/SUMMARY
[FreeTextEntry1] : 78 yo male with mixed obstructive/restrictive lung  dz with recent acute on chronic hypercapnic/hypoxemic respiratory failure. Presently he is requiring a higher oxygen flow rate. He is to follow up repeat sleep study results performed at [place]. I gave him a sample of trelegy to use daily with PRN albuterol. Treatment adjustment will depend on symptomatic needs.\par I had a very long discussion with the patient and his daughter who was present. He will be re evaluated next month prior to going to [facility] for vacation.\par He is to follow up with his PMD as before.

## 2019-06-17 NOTE — REVIEW OF SYSTEMS
[Fatigue] : fatigue [As Noted in HPI] : as noted in HPI [Hypertension] : ~T hypertension [Dyspnea] : dyspnea [Negative] : Endocrine [Recent Wt Gain (___ Lbs)] : no recent weight gain [Hemoptysis] : no hemoptysis [Cough] : no cough [Sputum] : not coughing up ~M sputum [Wheezing] : no wheezing [Orthopnea] : no orthopnea [Snoring] : no snoring [Hypersomnolence] : not sleeping much more than usual [Edema] : ~T edema was not present

## 2019-07-10 ENCOUNTER — APPOINTMENT (OUTPATIENT)
Dept: PULMONOLOGY | Facility: CLINIC | Age: 79
End: 2019-07-10
Payer: MEDICARE

## 2019-07-10 VITALS
SYSTOLIC BLOOD PRESSURE: 123 MMHG | BODY MASS INDEX: 42.88 KG/M2 | HEIGHT: 63 IN | DIASTOLIC BLOOD PRESSURE: 58 MMHG | OXYGEN SATURATION: 88 % | WEIGHT: 242 LBS

## 2019-07-10 PROCEDURE — 99214 OFFICE O/P EST MOD 30 MIN: CPT

## 2019-07-10 NOTE — REVIEW OF SYSTEMS
[Fatigue] : no fatigue [Recent Wt Gain (___ Lbs)] : no recent weight gain [As Noted in HPI] : as noted in HPI [Cough] : no cough [Sputum] : not coughing up ~M sputum [Hemoptysis] : no hemoptysis [Dyspnea] : dyspnea [Wheezing] : no wheezing [Hypertension] : ~T hypertension [Orthopnea] : no orthopnea [Edema] : ~T edema was not present [Snoring] : no snoring [Hypersomnolence] : not sleeping much more than usual [Negative] : Endocrine

## 2019-07-10 NOTE — HISTORY OF PRESENT ILLNESS
[FreeTextEntry1] : 78 yo male with hx of mixed lung abnormality and SOURAV presents for follow up. The patient feels "better" on continuous oxygen, presently at 3 l/m. He continue to have FIGUEROA without cough, chest pain or hemoptysis. The patient is planning to fly to [facility].

## 2019-07-10 NOTE — PHYSICAL EXAM
[Well Groomed] : well groomed [No Deformities] : no deformities [FreeTextEntry1] : Obese [General Appearance - In No Acute Distress] : no acute distress [Normal Conjunctiva] : the conjunctiva exhibited no abnormalities [Eyelids - No Xanthelasma] : the eyelids demonstrated no xanthelasmas [Neck Appearance] : the appearance of the neck was normal [Neck Cervical Mass (___cm)] : no neck mass was observed [Jugular Venous Distention Increased] : there was no jugular-venous distention [Thyroid Diffuse Enlargement] : the thyroid was not enlarged [Thyroid Nodule] : there were no palpable thyroid nodules [Heart Rate And Rhythm] : heart rate and rhythm were normal [Heart Sounds] : normal S1 and S2 [Murmurs] : no murmurs present [Exaggerated Use Of Accessory Muscles For Inspiration] : no accessory muscle use [Decreased Breath Sounds] : decreased breath sounds [Bowel Sounds] : normal bowel sounds [Abdomen Tenderness] : non-tender [Abdomen Soft] : soft [Nail Clubbing] : no clubbing of the fingernails [Cyanosis, Localized] : no localized cyanosis [Petechial Hemorrhages (___cm)] : no petechial hemorrhages [1+ Pitting] : 1+  pitting [] : no rash [Skin Color & Pigmentation] : normal skin color and pigmentation [No Venous Stasis] : no venous stasis [No Skin Ulcers] : no skin ulcer [Skin Lesions] : no skin lesions [No Focal Deficits] : no focal deficits [No Xanthoma] : no  xanthoma was observed [Oriented To Time, Place, And Person] : oriented to person, place, and time [Impaired Insight] : insight and judgment were intact [Affect] : the affect was normal

## 2019-07-10 NOTE — DISCUSSION/SUMMARY
[FreeTextEntry1] : 80 yo male with mixed PFT abnormality with SOURAV at near baseline on supplemental oxygen and nocturnal PAP. He is to continue treatment as before. Lasix 40 mg daily for 7 days prescribed with daily potassium containing fruit. He is to increase inogen to 4 l/m for future flight to [facility].

## 2019-07-26 ENCOUNTER — RX RENEWAL (OUTPATIENT)
Age: 79
End: 2019-07-26

## 2019-07-26 RX ORDER — ALBUTEROL SULFATE 90 UG/1
108 (90 BASE) AEROSOL, METERED RESPIRATORY (INHALATION) EVERY 6 HOURS
Qty: 2 | Refills: 0 | Status: ACTIVE | COMMUNITY
Start: 2019-07-26 | End: 1900-01-01

## 2019-08-28 NOTE — DISCHARGE NOTE ADULT - THE PATIENT HAS
no difficulties
Detail Level: Generalized
General Sunscreen Counseling: I recommended a broad spectrum sunscreen with a SPF of 30 or higher.  I explained that SPF 30 sunscreens block approximately 97 percent of the sun's harmful rays.  Sunscreens should be applied at least 15 minutes prior to expected sun exposure and then every 2 hours after that as long as sun exposure continues. If swimming or exercising sunscreen should be reapplied every 45 minutes to an hour after getting wet or sweating.  One ounce, or the equivalent of a shot glass full of sunscreen, is adequate to protect the skin not covered by a bathing suit. I also recommended a lip balm with a sunscreen as well. Sun protective clothing can be used in lieu of sunscreen but must be worn the entire time you are exposed to the sun's rays. ABCDEs/monthly SSE

## 2019-09-25 ENCOUNTER — APPOINTMENT (OUTPATIENT)
Dept: PULMONOLOGY | Facility: CLINIC | Age: 79
End: 2019-09-25
Payer: MEDICARE

## 2019-09-25 VITALS
DIASTOLIC BLOOD PRESSURE: 77 MMHG | OXYGEN SATURATION: 94 % | WEIGHT: 225 LBS | BODY MASS INDEX: 39.87 KG/M2 | HEIGHT: 63 IN | SYSTOLIC BLOOD PRESSURE: 131 MMHG | HEART RATE: 59 BPM

## 2019-09-25 PROCEDURE — 99214 OFFICE O/P EST MOD 30 MIN: CPT

## 2019-09-25 RX ORDER — FUROSEMIDE 40 MG/1
40 TABLET ORAL DAILY
Qty: 10 | Refills: 0 | Status: DISCONTINUED | COMMUNITY
Start: 2019-07-10 | End: 2019-09-25

## 2019-09-25 RX ORDER — FLUTICASONE PROPIONATE AND SALMETEROL 50; 250 UG/1; UG/1
250-50 POWDER RESPIRATORY (INHALATION)
Qty: 2 | Refills: 0 | Status: DISCONTINUED | COMMUNITY
Start: 2016-06-17 | End: 2019-09-25

## 2019-09-25 RX ORDER — ALBUTEROL SULFATE 90 UG/1
108 (90 BASE) AEROSOL, METERED RESPIRATORY (INHALATION)
Qty: 3 | Refills: 1 | Status: ACTIVE | COMMUNITY
Start: 2019-09-25 | End: 1900-01-01

## 2019-09-25 NOTE — REVIEW OF SYSTEMS
[Fatigue] : no fatigue [As Noted in HPI] : as noted in HPI [Recent Wt Gain (___ Lbs)] : no recent weight gain [Cough] : no cough [Hemoptysis] : no hemoptysis [Sputum] : not coughing up ~M sputum [Dyspnea] : dyspnea [Wheezing] : no wheezing [Orthopnea] : no orthopnea [Hypertension] : ~T hypertension [Edema] : ~T edema was not present [Snoring] : no snoring [Hypersomnolence] : not sleeping much more than usual [Negative] : Endocrine

## 2019-09-25 NOTE — HISTORY OF PRESENT ILLNESS
[FreeTextEntry1] : 78 yo male with mixed abnormality on PFT, on supplemental oxygen and nocturnal PAP, presents for follow up. The patient is "OK" on PRN nebulized bronchodilators. He recently returned from [facility] having used inogen during the flight without difficulty.

## 2019-09-25 NOTE — DISCUSSION/SUMMARY
[FreeTextEntry1] : 80 yo male with stable pulmonary disease. He is to continue supplemental oxygen as before. Continued nebulized meds as before. Treatment adjustment will depend on symptomatic needs. He is to follow up with his PMD as before.

## 2019-09-25 NOTE — PHYSICAL EXAM
[Well Groomed] : well groomed [General Appearance - In No Acute Distress] : no acute distress [No Deformities] : no deformities [FreeTextEntry1] : Obese [Normal Conjunctiva] : the conjunctiva exhibited no abnormalities [Eyelids - No Xanthelasma] : the eyelids demonstrated no xanthelasmas [Neck Appearance] : the appearance of the neck was normal [Neck Cervical Mass (___cm)] : no neck mass was observed [Jugular Venous Distention Increased] : there was no jugular-venous distention [Thyroid Diffuse Enlargement] : the thyroid was not enlarged [Thyroid Nodule] : there were no palpable thyroid nodules [Heart Rate And Rhythm] : heart rate and rhythm were normal [Heart Sounds] : normal S1 and S2 [Murmurs] : no murmurs present [Exaggerated Use Of Accessory Muscles For Inspiration] : no accessory muscle use [Decreased Breath Sounds] : decreased breath sounds [Abdomen Soft] : soft [Bowel Sounds] : normal bowel sounds [Nail Clubbing] : no clubbing of the fingernails [Abdomen Tenderness] : non-tender [Cyanosis, Localized] : no localized cyanosis [Petechial Hemorrhages (___cm)] : no petechial hemorrhages [1+ Pitting] : 1+  pitting [Skin Color & Pigmentation] : normal skin color and pigmentation [] : no rash [No Venous Stasis] : no venous stasis [Skin Lesions] : no skin lesions [No Skin Ulcers] : no skin ulcer [No Xanthoma] : no  xanthoma was observed [Oriented To Time, Place, And Person] : oriented to person, place, and time [No Focal Deficits] : no focal deficits [Impaired Insight] : insight and judgment were intact [Affect] : the affect was normal

## 2019-10-01 NOTE — PATIENT PROFILE ADULT. - CENTRAL VENOUS CATHETER
cc: COPD    Subjective:     Valorie Sierra is a 81 y.o. female presenting for follow-up of her COPD.    COPD  Chronic medical diagnosis.  Continues to use Symbicort twice a day.  Has not needed her albuterol rescue inhaler.  Does have a history of smoking but quit back in 2007.  Oxygen saturation is currently 95% on room air.  She does not follow-up with a pulmonologist and does not want a referral.      Diastolic CHF/ Pulmonary Hypertension/ CAD  Chronic medical diagnosis.  Recently had a follow-up with cardiology, Dr. Stanley.  Denies any lower extremity edema, shortness of breath or wheezing.  She has some questions about her diagnosis of pulmonary hypertension.  Previous echocardiogram was in May 2018.  Continues to take Lasix 20 mg, half a tablet 3 times a week, metoprolol 12.5 mg twice a day, lisinopril 20 mg, and aspirin 81 mg daily.  No oxygen  No sob, no wheezing    Mild depression  Ongoing medical diagnosis.  She has never been on any medications in the past.  She does mention that her 61-year-old alcoholic son lives with her.  He is fine except for the times that he drinks heavily.  Mentions that his drinking bothers her and causes her to feel down.  Also mentions that she does not have any other family except for him.  Unfortunately her daughter passed away many years ago and fire.      CKD  Chronic medical diagnosis.  Previous labs from May 2019 have GFR decreased at 52.  Urine microalbumin creatinine ratio checked at that time was increased at 52.    Impaired glucose tolerance  Chronic medical diagnosis.  Recent labs from May do show increased hemoglobin A1c of 6.3%.  Denies any polyuria or weight loss.    Review of systems:  See above and negative for fevers, chills,  Allergies   Allergen Reactions   • Iodine Itching   • Oxycodone-Acetaminophen Hives   • Sulfa Drugs Vomiting         Current Outpatient Medications:   •  Probiotic Product (PROBIOTIC DAILY PO), Take  by mouth., Disp: , Rfl:   •   SYMBICORT 80-4.5 MCG/ACT Aerosol, INHALE 2 PUFFS BY MOUTH 2 TIMES A DAY., Disp: 1 Inhaler, Rfl: 0  •  atorvastatin (LIPITOR) 80 MG tablet, Take 1 Tab by mouth every evening., Disp: 90 Tab, Rfl: 3  •  lisinopril (PRINIVIL) 20 MG Tab, Take 1 Tab by mouth every day., Disp: 90 Tab, Rfl: 3  •  metoprolol (LOPRESSOR) 25 MG Tab, Take 0.5 Tabs by mouth 2 times a day., Disp: 90 Tab, Rfl: 3  •  albuterol 108 (90 Base) MCG/ACT Aero Soln inhalation aerosol, Inhale 2 Puffs by mouth every 6 hours as needed for Shortness of Breath., Disp: 8.5 g, Rfl: 3  •  omeprazole (PRILOSEC) 20 MG delayed-release capsule, TAKE 1 CAP BY MOUTH EVERY DAY., Disp: 90 Cap, Rfl: 3  •  furosemide (LASIX) 20 MG Tab, Take 1/2 tablet 3 times a week, Disp: 45 Tab, Rfl: 0  •  Calcium Carbonate-Vit D-Min (CALCIUM 1200 PO), Take  by mouth., Disp: , Rfl:   •  aspirin EC (ECOTRIN) 81 MG TBEC, Take 1 Tab by mouth every day., Disp: 90 Tab, Rfl: 3    Allergies, past medical history, past surgical history, family history, social history reviewed and updated    Objective:     Vitals: /62 (BP Location: Left arm, Patient Position: Sitting, BP Cuff Size: Adult)   Pulse 60   Temp 36.3 °C (97.3 °F) (Temporal)   Resp 16   Ht 1.524 m (5')   Wt 72.1 kg (159 lb)   SpO2 95%   BMI 31.05 kg/m²   General:  Alert, pleasant, NAD  Eyes:  normal inspection of conjunctivae and lids, EOMI,   ENMT:  External ears and nose are normal.    Neck  supple,   Heart:  Regular rate and rhythm,  No LE edema  Respiratory:  Normal respiratory effort, Clear to auscultation bilaterally.  Abdomen:   soft, Non-distended,   Skin:  Warm, dry, no rashes,   Musculoskeletal:  Normal gait, Normal digits and nails.  Neurological: No tremors,   Psych:   Affect/mood is normal, judgement is good, memory is intact, grooming is appropriate.    Assessment/Plan:     Valorie was seen today for follow-up.    Diagnoses and all orders for this visit:    Panlobular emphysema (HCC)  Chronic medical diagnosis.   Continue with Symbicort.  Referral to pulmonology discussed with patient.  She currently declines.    Pulmonary hypertension (HCC)  Coronary artery disease involving native coronary artery of native heart with angina pectoris (HCC)  Chronic diastolic congestive heart failure (HCC)  Chronic medical diagnosis.  Currently stable.  Continue to follow-up with cardiology.    Mild depression (HCC)  Chronic medical diagnosis.  Referral to psychology or antidepressants discussed with patient.  Currently declines.     CKD (chronic kidney disease) stage 3, GFR 30-59 ml/min (HCC)  Chronic medical diagnosis.  Currently stable.  We will continue to monitor.  Would like to hold off on repeating labs and urine microalbumin.    Impaired glucose tolerance   chronic medical diagnosis.  Stable.  We will continue to monitor.              Return in about 3 months (around 1/1/2020).  This note was created using voice recognition software (Dragon). The accuracy of the dictation is limited by the abilities of the software. I have reviewed the note prior to signing, however some errors in grammar and context are still possible. If you have any questions related to this note please do not hesitate to contact our office.     no

## 2020-03-30 ENCOUNTER — APPOINTMENT (OUTPATIENT)
Dept: PULMONOLOGY | Facility: CLINIC | Age: 80
End: 2020-03-30

## 2020-06-24 ENCOUNTER — APPOINTMENT (OUTPATIENT)
Dept: PULMONOLOGY | Facility: CLINIC | Age: 80
End: 2020-06-24
Payer: MEDICARE

## 2020-06-24 VITALS
HEART RATE: 63 BPM | SYSTOLIC BLOOD PRESSURE: 132 MMHG | WEIGHT: 238 LBS | DIASTOLIC BLOOD PRESSURE: 71 MMHG | HEIGHT: 63 IN | BODY MASS INDEX: 42.17 KG/M2 | OXYGEN SATURATION: 91 %

## 2020-06-24 PROCEDURE — 99214 OFFICE O/P EST MOD 30 MIN: CPT

## 2020-06-28 NOTE — DISCUSSION/SUMMARY
[FreeTextEntry1] : 81 yo male with oxygen dependent lung disease, at baseline. He is to continue nebulized bronchodilators as needed with continuous oxygen use.. Prednisone dose will be adjusted by his rheumatologist. His daughter was present.

## 2020-06-28 NOTE — HISTORY OF PRESENT ILLNESS
[TextBox_4] : 79 yo male with mixed abnormality on PFT presents for follow up. The patient is "OK" on supplemental oxygen and nocturnal PAP. He denies cough, chest pain or hemoptysis. He is on BID 5 mg prednisone with PRN nebulized bronchodilators.

## 2020-08-12 ENCOUNTER — APPOINTMENT (OUTPATIENT)
Dept: PULMONOLOGY | Facility: CLINIC | Age: 80
End: 2020-08-12
Payer: MEDICARE

## 2020-08-12 VITALS
WEIGHT: 230 LBS | HEART RATE: 78 BPM | SYSTOLIC BLOOD PRESSURE: 175 MMHG | OXYGEN SATURATION: 86 % | DIASTOLIC BLOOD PRESSURE: 84 MMHG | BODY MASS INDEX: 40.74 KG/M2

## 2020-08-12 PROCEDURE — 99214 OFFICE O/P EST MOD 30 MIN: CPT

## 2020-08-13 NOTE — REVIEW OF SYSTEMS
[SOB on Exertion] : sob on exertion [Confusion] : confusion [Negative] : Psychiatric [Sputum] : no sputum [Cough] : no cough [Headache] : no headache [Dizziness] : no dizziness

## 2020-08-13 NOTE — PHYSICAL EXAM
[No Acute Distress] : no acute distress [Normal Oropharynx] : normal oropharynx [Normal Appearance] : normal appearance [No Neck Mass] : no neck mass [Normal S1, S2] : normal s1, s2 [Normal Rate/Rhythm] : normal rate/rhythm [No Resp Distress] : no resp distress [No Murmurs] : no murmurs [Clear to Auscultation Bilaterally] : clear to auscultation bilaterally [Benign] : benign [No Abnormalities] : no abnormalities [No Clubbing] : no clubbing [Normal Gait] : normal gait [No Cyanosis] : no cyanosis [No Edema] : no edema [FROM] : FROM [Normal Color/ Pigmentation] : normal color/ pigmentation [No Focal Deficits] : no focal deficits [Oriented x3] : oriented x3 [Normal Affect] : normal affect

## 2020-08-13 NOTE — HISTORY OF PRESENT ILLNESS
[TextBox_4] : 81 yo male with hx of mixed abnormality on PFT brought to office by daughter because she is "concerned" over patient's mental status. As per his daughter, he appears confused at times, without other complaints. He is able to recall past events with great detail. He is compliant with PAP and oxygen use and nebulized meds. He denies fever or cough.

## 2020-08-13 NOTE — DISCUSSION/SUMMARY
[FreeTextEntry1] : 81 yo male with chronic lung abnormality with "confusion" at times as per daughter. Present exam appears normal and at baseline for patient. He answers all questions appropriately. Possible depression. Neurology evaluation if problem persists.He is to follow up with his PMD as before.

## 2020-09-23 ENCOUNTER — APPOINTMENT (OUTPATIENT)
Dept: PULMONOLOGY | Facility: CLINIC | Age: 80
End: 2020-09-23
Payer: MEDICARE

## 2020-09-23 VITALS
SYSTOLIC BLOOD PRESSURE: 157 MMHG | HEART RATE: 75 BPM | BODY MASS INDEX: 42.35 KG/M2 | OXYGEN SATURATION: 85 % | DIASTOLIC BLOOD PRESSURE: 76 MMHG | HEIGHT: 63 IN | WEIGHT: 239 LBS

## 2020-09-23 PROCEDURE — 99214 OFFICE O/P EST MOD 30 MIN: CPT

## 2020-09-23 RX ORDER — PREDNISONE 5 MG/1
5 TABLET ORAL DAILY
Qty: 100 | Refills: 0 | Status: ACTIVE | COMMUNITY
Start: 2020-09-23 | End: 1900-01-01

## 2020-09-23 NOTE — REVIEW OF SYSTEMS
[Cough] : cough [Sputum] : sputum [SOB on Exertion] : sob on exertion [Headache] : no headache [Dizziness] : no dizziness [Confusion] : confusion [Negative] : Endocrine

## 2020-09-23 NOTE — DISCUSSION/SUMMARY
[FreeTextEntry1] : 81 yo male with mixed abnormality on PFT with recent cough and leg edema.Likely right heart failure due to OAD exacerbation. Prednisone increased to 40 mg daily for 5 days. He was also prescribed lasix 20 mg daily. Continued compliance with CPAP use stressed. He had the influenza vaccine prior and is to follow up with his PMD as before and for the pneumococcal vaccine. His daughter was present.

## 2020-09-23 NOTE — HISTORY OF PRESENT ILLNESS
[Former] : former [>= 30 pack years] : >= 30 pack years [TextBox_4] : 79 yo male with hx of mixed abnormality on PFT and SOURAV, compliant with CPAP use, presents complaining of PRN productive cough for 4 days associated with leg edema. He denies fever, chest pain or hemoptysis. He continues to use nebulized bronchodilators daily and supplemental oxygen.. He is also on 10 mg prednisone daily. [YearQuit] : 2000 [TextBox_19] : Denies snoring, daytime somnolence, apneic episodes, AM headaches

## 2020-09-23 NOTE — PHYSICAL EXAM
[No Acute Distress] : no acute distress [Normal Oropharynx] : normal oropharynx [Normal Appearance] : normal appearance [No Neck Mass] : no neck mass [Normal Rate/Rhythm] : normal rate/rhythm [Normal S1, S2] : normal s1, s2 [No Murmurs] : no murmurs [No Resp Distress] : no resp distress [Rales] : rales [No Abnormalities] : no abnormalities [Benign] : benign [Normal Gait] : normal gait [No Clubbing] : no clubbing [No Cyanosis] : no cyanosis [FROM] : FROM [Normal Color/ Pigmentation] : normal color/ pigmentation [No Focal Deficits] : no focal deficits [Oriented x3] : oriented x3 [Normal Affect] : normal affect [TextBox_105] : Mild pitting edema

## 2020-12-23 ENCOUNTER — APPOINTMENT (OUTPATIENT)
Dept: PULMONOLOGY | Facility: CLINIC | Age: 80
End: 2020-12-23
Payer: MEDICARE

## 2020-12-23 VITALS
HEART RATE: 63 BPM | WEIGHT: 220 LBS | DIASTOLIC BLOOD PRESSURE: 74 MMHG | OXYGEN SATURATION: 93 % | SYSTOLIC BLOOD PRESSURE: 120 MMHG | HEIGHT: 63 IN | BODY MASS INDEX: 38.98 KG/M2

## 2020-12-23 PROCEDURE — 99214 OFFICE O/P EST MOD 30 MIN: CPT

## 2020-12-23 PROCEDURE — 99072 ADDL SUPL MATRL&STAF TM PHE: CPT

## 2020-12-23 NOTE — DISCUSSION/SUMMARY
[FreeTextEntry1] : 79 yo male with chronic lung disease at baseline.He is to continue supplemental oxygen, nocturnal PAP and nebulized meds as before. He had the influenza vaccine and is up to date with the pneumococcal vaccine. He is to follow up with his PMD as before.

## 2020-12-23 NOTE — REVIEW OF SYSTEMS
[Cough] : no cough [Sputum] : no sputum [SOB on Exertion] : sob on exertion [Arthralgias] : arthralgias [Headache] : no headache [Dizziness] : no dizziness [Confusion] : no confusion [Depression] : depression [Negative] : Endocrine

## 2020-12-23 NOTE — HISTORY OF PRESENT ILLNESS
[Former] : former [>= 30 pack years] : >= 30 pack years [TextBox_4] : 79 yo male with hx of SOUARV and mixed abnormality on PFT presents for follow up. The patient\par is "OK" with use of supplemental oxygen and nocturnal PAP. He uses nebulized meds. He has lost 20 pounds recently because as per his daughter who is present he is "depressed". Recent cardiac evaluation was negative. [YearQuit] : 2000 [TextBox_29] : Denies snoring, daytime somnolence, apneic episodes, AM headaches

## 2021-04-13 ENCOUNTER — APPOINTMENT (OUTPATIENT)
Dept: RHEUMATOLOGY | Facility: CLINIC | Age: 81
End: 2021-04-13
Payer: MEDICARE

## 2021-04-13 ENCOUNTER — LABORATORY RESULT (OUTPATIENT)
Age: 81
End: 2021-04-13

## 2021-04-13 VITALS
WEIGHT: 210 LBS | SYSTOLIC BLOOD PRESSURE: 135 MMHG | TEMPERATURE: 97.7 F | HEART RATE: 66 BPM | BODY MASS INDEX: 38.64 KG/M2 | RESPIRATION RATE: 26 BRPM | DIASTOLIC BLOOD PRESSURE: 81 MMHG | OXYGEN SATURATION: 94 % | HEIGHT: 62 IN

## 2021-04-13 DIAGNOSIS — Z79.52 LONG TERM (CURRENT) USE OF SYSTEMIC STEROIDS: ICD-10-CM

## 2021-04-13 DIAGNOSIS — G60.9 HEREDITARY AND IDIOPATHIC NEUROPATHY, UNSPECIFIED: ICD-10-CM

## 2021-04-13 DIAGNOSIS — Z82.61 FAMILY HISTORY OF ARTHRITIS: ICD-10-CM

## 2021-04-13 PROCEDURE — 99072 ADDL SUPL MATRL&STAF TM PHE: CPT

## 2021-04-13 PROCEDURE — 99204 OFFICE O/P NEW MOD 45 MIN: CPT

## 2021-04-13 RX ORDER — SERTRALINE HYDROCHLORIDE 50 MG/1
50 TABLET, FILM COATED ORAL DAILY
Refills: 0 | Status: ACTIVE | COMMUNITY
Start: 2021-04-13

## 2021-04-14 NOTE — ASSESSMENT
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 \par Unsure if pt has neuropathy or radiculopathy from lumbar DDD? \par Concern given long term steroids of bone mineral density, and spina fracture..\par \par Plan\par -Labs w serologies, inflammatory markers\par -paresthesia work up\par -Xrays lumbar spine, DEXA scan \par RTO in 2 weeks to discuss dx and plan of treatment\par

## 2021-04-22 ENCOUNTER — APPOINTMENT (OUTPATIENT)
Dept: RHEUMATOLOGY | Facility: CLINIC | Age: 81
End: 2021-04-22
Payer: MEDICARE

## 2021-04-22 VITALS
RESPIRATION RATE: 24 BRPM | WEIGHT: 211 LBS | OXYGEN SATURATION: 96 % | TEMPERATURE: 98.1 F | HEIGHT: 62 IN | DIASTOLIC BLOOD PRESSURE: 71 MMHG | SYSTOLIC BLOOD PRESSURE: 130 MMHG | BODY MASS INDEX: 38.83 KG/M2 | HEART RATE: 69 BPM

## 2021-04-22 DIAGNOSIS — M79.2 NEURALGIA AND NEURITIS, UNSPECIFIED: ICD-10-CM

## 2021-04-22 PROCEDURE — 99214 OFFICE O/P EST MOD 30 MIN: CPT

## 2021-04-22 PROCEDURE — 99072 ADDL SUPL MATRL&STAF TM PHE: CPT

## 2021-04-23 PROBLEM — M79.2 NEUROPATHIC PAIN: Status: ACTIVE | Noted: 2021-04-13

## 2021-04-23 LAB
25(OH)D3 SERPL-MCNC: 26.4 NG/ML
ALBUMIN SERPL ELPH-MCNC: 4.3 G/DL
ALP BLD-CCNC: 122 U/L
ALT SERPL-CCNC: 14 U/L
ANION GAP SERPL CALC-SCNC: 16 MMOL/L
ARSENIC, BLOOD: 8 UG/L
AST SERPL-CCNC: 16 U/L
BASOPHILS # BLD AUTO: 0.05 K/UL
BASOPHILS NFR BLD AUTO: 0.3 %
BILIRUB SERPL-MCNC: 0.5 MG/DL
BUN SERPL-MCNC: 24 MG/DL
CADMIUM SERPL-MCNC: <0.5 UG/L
CALCIUM SERPL-MCNC: 9.8 MG/DL
CHLORIDE SERPL-SCNC: 99 MMOL/L
CO2 SERPL-SCNC: 26 MMOL/L
CREAT SERPL-MCNC: 0.77 MG/DL
CRP SERPL-MCNC: 11 MG/L
EOSINOPHIL # BLD AUTO: 0.25 K/UL
EOSINOPHIL NFR BLD AUTO: 1.7 %
ERYTHROCYTE [SEDIMENTATION RATE] IN BLOOD BY WESTERGREN METHOD: 41 MM/HR
ESTIMATED AVERAGE GLUCOSE: 126 MG/DL
FOLATE SERPL-MCNC: >20 NG/ML
GLUCOSE SERPL-MCNC: 96 MG/DL
HBA1C MFR BLD HPLC: 6 %
HBV CORE IGG+IGM SER QL: NONREACTIVE
HBV SURFACE AB SER QL: NONREACTIVE
HBV SURFACE AG SER QL: NONREACTIVE
HCT VFR BLD CALC: 44.9 %
HCV AB SER QL: NONREACTIVE
HCV S/CO RATIO: 0.96 S/CO
HGB BLD-MCNC: 13.9 G/DL
IL6 SERPL-MCNC: 6.5 PG/ML
IMM GRANULOCYTES NFR BLD AUTO: 0.6 %
LEAD BLD-MCNC: 1 UG/DL
LYMPHOCYTES # BLD AUTO: 2.18 K/UL
LYMPHOCYTES NFR BLD AUTO: 15 %
M TB IFN-G BLD-IMP: NEGATIVE
MAN DIFF?: NORMAL
MCHC RBC-ENTMCNC: 30.5 PG
MCHC RBC-ENTMCNC: 31 GM/DL
MCV RBC AUTO: 98.5 FL
MERCURY BLD-MCNC: 5.8 UG/L
MONOCYTES # BLD AUTO: 0.8 K/UL
MONOCYTES NFR BLD AUTO: 5.5 %
NEUTROPHILS # BLD AUTO: 11.18 K/UL
NEUTROPHILS NFR BLD AUTO: 76.9 %
PLATELET # BLD AUTO: 289 K/UL
POTASSIUM SERPL-SCNC: 3.8 MMOL/L
PROT SERPL-MCNC: 7.2 G/DL
QUANTIFERON TB PLUS MITOGEN MINUS NIL: 8.63 IU/ML
QUANTIFERON TB PLUS NIL: 0.01 IU/ML
QUANTIFERON TB PLUS TB1 MINUS NIL: 0 IU/ML
QUANTIFERON TB PLUS TB2 MINUS NIL: 0.01 IU/ML
RBC # BLD: 4.56 M/UL
RBC # FLD: 14.1 %
SODIUM SERPL-SCNC: 140 MMOL/L
TSH SERPL-ACNC: 2.39 UIU/ML
VIT B12 SERPL-MCNC: 539 PG/ML
WBC # FLD AUTO: 14.55 K/UL

## 2021-04-23 NOTE — HISTORY OF PRESENT ILLNESS
[FreeTextEntry1] : 80 y/o M here for initial visit. \par \par Daughter at visit helped w hx. Pt had dx of likely PMR 3 years ago, and was put on steroids. He has been on 3 years of prednisone 10mg...\par Has had issues w neuropathy. Has been evaluated by neurologist. Pt w numbness, and tingling in feet. Has had neuropathy for years. \par Pt had sciatica, had injection put on. \par Pt w chronic back pain. Pt has been with back pain for 3 months. \par Pt currently on prednisone 10mg. No sure why he is on chronic steroids? For FM? Pt has been on steroids for 3 \par \par No fevers, h/a, rashes, hair loss, oral ulcers, epistaxis, sinusitis,  swollen glands, dry mouth, dry eyes, CP,cough, vision changes, abdominal pain, GERD, n/v/d, blood in stool or urine, focal weakness,  Raynaud's, swelling, weight loss. \par +teary eyes \par +COPD has SOB, sleep apnea \par +unable to sleep straight \par +urinary frequency \par +gastritis, on protonix \par +pain in muscles  [___ Week(s) Ago] : [unfilled] week(s) ago

## 2021-04-23 NOTE — HISTORY OF PRESENT ILLNESS
[FreeTextEntry1] : =pt w persistent back pain\par =no h/a, visual changes, jaw claudication, shoulder or hip pain; no fevers, SOB, CP, abdominal pain, n/v/d \par

## 2021-04-23 NOTE — PHYSICAL EXAM
[FreeTextEntry1] : deltoids, iliopsoas 4/5 X 4 extremeties  [Sclera] : the sclera and conjunctiva were normal [Auscultation Breath Sounds / Voice Sounds] : lungs were clear to auscultation bilaterally [Heart Sounds] : normal S1 and S2 [Heart Sounds Gallop] : no gallops [Murmurs] : no murmurs [Heart Sounds Pericardial Friction Rub] : no pericardial rub [Abdomen Tenderness] : non-tender [Cervical Lymph Nodes Enlarged Posterior Bilaterally] : posterior cervical [Cervical Lymph Nodes Enlarged Anterior Bilaterally] : anterior cervical [Supraclavicular Lymph Nodes Enlarged Bilaterally] : supraclavicular [Axillary Lymph Nodes Enlarged Bilaterally] : axillary [] : no rash [Oriented To Time, Place, And Person] : oriented to person, place, and time

## 2021-04-23 NOTE — DATA REVIEWED
[FreeTextEntry1] : Labs reviewed from 4/21\par WBC 14, alk phos 122\par CRP 11, ESR 41, IL6 wnl \par HbA1c 6\par HIV, heavy metal screen, B12, folic acid wnl \par \par Xray 4/21\par lumbar spine: Mod compression fracture of L4 vertebral body \par Slight spondylolisthesis of L4 to L5 presumed to reflect degenerative laxity of facet joints\par DEXA scan 4/21 wnl

## 2021-04-23 NOTE — ASSESSMENT
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 Furthermore, pt not typically presenting with PMR symptoms (prox polyarthralgias, polymyalgias). Will c/w tapering steroids as pt seems to have comorbidities from long term steroids. \par \par Pt w L4 compression fx on imaging. Seems like pt will have limited options, as pt does not seem as best surgical candidate. Will refer to spine sx for options. Will touch base w pulm if pt can use duloxetine for pain? \par \par Not sure of etiology of lower extremity paresthesias? Lumbar MRI might be more sensitive to show degenerative changes, but will refer to spine surgery to make decision. \par \par Plan\par -c/w prednisone 7.5 mg for 3 weeks, then will lower further\par -spine sx referral\par RTO 3 weeks\par

## 2021-04-26 ENCOUNTER — APPOINTMENT (OUTPATIENT)
Dept: ORTHOPEDIC SURGERY | Facility: CLINIC | Age: 81
End: 2021-04-26
Payer: MEDICARE

## 2021-04-26 VITALS
OXYGEN SATURATION: 91 % | HEIGHT: 60 IN | SYSTOLIC BLOOD PRESSURE: 159 MMHG | WEIGHT: 210 LBS | HEART RATE: 75 BPM | BODY MASS INDEX: 41.23 KG/M2 | DIASTOLIC BLOOD PRESSURE: 79 MMHG

## 2021-04-26 DIAGNOSIS — Z86.69 PERSONAL HISTORY OF OTHER DISEASES OF THE NERVOUS SYSTEM AND SENSE ORGANS: ICD-10-CM

## 2021-04-26 DIAGNOSIS — G89.29 DORSALGIA, UNSPECIFIED: ICD-10-CM

## 2021-04-26 DIAGNOSIS — M54.9 DORSALGIA, UNSPECIFIED: ICD-10-CM

## 2021-04-26 DIAGNOSIS — Z87.09 PERSONAL HISTORY OF OTHER DISEASES OF THE RESPIRATORY SYSTEM: ICD-10-CM

## 2021-04-26 DIAGNOSIS — Z78.9 OTHER SPECIFIED HEALTH STATUS: ICD-10-CM

## 2021-04-26 DIAGNOSIS — Z72.3 LACK OF PHYSICAL EXERCISE: ICD-10-CM

## 2021-04-26 PROCEDURE — 99204 OFFICE O/P NEW MOD 45 MIN: CPT | Mod: 57

## 2021-04-26 PROCEDURE — 72070 X-RAY EXAM THORAC SPINE 2VWS: CPT

## 2021-04-26 PROCEDURE — 72110 X-RAY EXAM L-2 SPINE 4/>VWS: CPT

## 2021-04-26 PROCEDURE — 22310 CLOSED TX VERT FX W/O MANJ: CPT

## 2021-04-26 PROCEDURE — 99072 ADDL SUPL MATRL&STAF TM PHE: CPT

## 2021-04-26 RX ORDER — ASPIRIN 81 MG
81 TABLET, DELAYED RELEASE (ENTERIC COATED) ORAL
Refills: 0 | Status: ACTIVE | COMMUNITY

## 2021-04-26 RX ORDER — TAMSULOSIN HYDROCHLORIDE 0.4 MG/1
0.4 CAPSULE ORAL
Refills: 0 | Status: ACTIVE | COMMUNITY

## 2021-04-28 NOTE — HISTORY OF PRESENT ILLNESS
[de-identified] : This is an 81-year-old male that is been dealing with significant low back and bilateral leg pain.  The symptoms have been ongoing for several months.  He states that his left-sided leg pain is worse than his right-sided leg pain.  His pain goes down his posterior lateral thigh into his posterior lateral calf.  At this point he cannot walk more than a block due to the significant pain.  He does feel better when he leans forward on a shopping cart.  Please note that he does have a history of polymyalgia rheumatica and has previously been on steroids for a prolonged amount of time.  He is currently working with the rheumatologist to come down off this prescribe steroid regimen.  He has a known history of neuropathy as well.  He denies any bowel bladder issues.  He denies any saddle anesthesia.

## 2021-04-28 NOTE — ASSESSMENT
[FreeTextEntry1] : This is an 81-year-old male that presents today for evaluation of his low back and leg pain.  He does have symptoms that are concerning for lumbar neurogenic claudication.  Furthermore he does have weakness objectively found on my exam.  Therefore I would like to pursue a lumbar MRI.  I discussed with the patient and the patient's family the use of a LSO brace for comfort.  He should take Tylenol as needed for pain relief.  I will see him back in approximately 3 to 4 weeks for repeat clinical evaluation.  I encouraged the family and the patient to come back sooner if he has any new or worsening symptoms.  Please note that over 45 minutes of time was spent in care of this patient which includes previsit preparation, in person visit, post visit documentation.

## 2021-04-28 NOTE — PHYSICAL EXAM
[de-identified] : Lumbar Physical Exam\par \par Gait -antalgic\par \par Station -forward pitched\par \par Sagittal balance -positive\par \par Compensatory mechanism? - None\par \par Reflexes\par Patellar - normal\par Gastroc - normal\par Clonus - Yes\par \par Hip Exam - Normal\par \par Straight leg raise - none\par \par Pulses - 2+ dp/pt\par \par Range of motion - normal\par \par Sensation \par Sensation intact to light touch in L1, L2, L3, L4, L5 and S1 dermatomes bilaterally\par \par Motor\par 	IP	Quad	HS	TA	Gastroc	EHL\par Right	4/5	5/5	5/5	5/5	5/5	5/5\par Left	4/5	5/5	5/5	5/5	5/5	5/5 [de-identified] : Thoracic radiographs\par Osteopenia noted\par Thoracic spondylosis noted\par Thoracic kyphosis within normal limits\par \par Lumbar radiographs\par L4 compression fracture noted\par No instability on flexion-extension radiographs\par Lumbar lordosis maintained

## 2021-04-29 RX ORDER — DULOXETINE HYDROCHLORIDE 20 MG/1
20 CAPSULE, DELAYED RELEASE PELLETS ORAL
Qty: 90 | Refills: 0 | Status: ACTIVE | COMMUNITY
Start: 2021-04-29 | End: 1900-01-01

## 2021-05-06 ENCOUNTER — APPOINTMENT (OUTPATIENT)
Dept: RHEUMATOLOGY | Facility: CLINIC | Age: 81
End: 2021-05-06
Payer: MEDICARE

## 2021-05-06 VITALS
BODY MASS INDEX: 41.43 KG/M2 | RESPIRATION RATE: 22 BRPM | SYSTOLIC BLOOD PRESSURE: 135 MMHG | WEIGHT: 211 LBS | OXYGEN SATURATION: 95 % | DIASTOLIC BLOOD PRESSURE: 81 MMHG | HEIGHT: 60 IN | TEMPERATURE: 97.9 F | HEART RATE: 67 BPM

## 2021-05-06 DIAGNOSIS — E66.9 OBESITY, UNSPECIFIED: ICD-10-CM

## 2021-05-06 DIAGNOSIS — E24.2 DRUG-INDUCED CUSHING'S SYNDROME: ICD-10-CM

## 2021-05-06 PROCEDURE — 99072 ADDL SUPL MATRL&STAF TM PHE: CPT

## 2021-05-06 PROCEDURE — 99214 OFFICE O/P EST MOD 30 MIN: CPT

## 2021-05-07 PROBLEM — E24.2 IATROGENIC CUSHINGOID FEATURES: Status: ACTIVE | Noted: 2021-04-14

## 2021-05-07 LAB
BASOPHILS # BLD AUTO: 0.06 K/UL
BASOPHILS NFR BLD AUTO: 0.5 %
EOSINOPHIL # BLD AUTO: 0.2 K/UL
EOSINOPHIL NFR BLD AUTO: 1.5 %
HCT VFR BLD CALC: 43.9 %
HGB BLD-MCNC: 14.2 G/DL
IMM GRANULOCYTES NFR BLD AUTO: 0.6 %
LYMPHOCYTES # BLD AUTO: 2.06 K/UL
LYMPHOCYTES NFR BLD AUTO: 15.8 %
MAN DIFF?: NORMAL
MCHC RBC-ENTMCNC: 30.5 PG
MCHC RBC-ENTMCNC: 32.3 GM/DL
MCV RBC AUTO: 94.4 FL
MONOCYTES # BLD AUTO: 0.76 K/UL
MONOCYTES NFR BLD AUTO: 5.8 %
NEUTROPHILS # BLD AUTO: 9.84 K/UL
NEUTROPHILS NFR BLD AUTO: 75.8 %
PLATELET # BLD AUTO: 390 K/UL
RBC # BLD: 4.65 M/UL
RBC # FLD: 13.1 %
WBC # FLD AUTO: 13 K/UL

## 2021-05-07 NOTE — PHYSICAL EXAM
[Sclera] : the sclera and conjunctiva were normal [Auscultation Breath Sounds / Voice Sounds] : lungs were clear to auscultation bilaterally [Heart Sounds Gallop] : no gallops [Heart Sounds] : normal S1 and S2 [Murmurs] : no murmurs [Heart Sounds Pericardial Friction Rub] : no pericardial rub [Abdomen Tenderness] : non-tender [Musculoskeletal - Swelling] : no joint swelling seen [FreeTextEntry1] : multiple tender points  [] : no rash [Oriented To Time, Place, And Person] : oriented to person, place, and time

## 2021-05-07 NOTE — HISTORY OF PRESENT ILLNESS
[___ Week(s) Ago] : [unfilled] week(s) ago [FreeTextEntry1] : =pt prednisone 7.5mg daily \par =still has some back pain, and multiple joint pain\par =daughter says pt has been depressed, and does not like to do activities\par =no fevers, SOB, CP, abdominal pain, n/v/d, rashes\par

## 2021-05-07 NOTE — ASSESSMENT
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 \par \par Pt w no difference in symptoms w lowering steroids. PMR dx questionable. \par Pt does have multiple tender points. Depression. FM component? \par L4 compression fx, ortho note appreciated. Recommended for PT, but pt adament and does not want to try PT.\par Advised daughter and pt to seek therapy, as pt seems extremely depressed. \par \par Plan\par -Labs w inflammatory markers\par -lower to prednisone 5mg\par RTO 1 month\par

## 2021-05-10 LAB
ALBUMIN SERPL ELPH-MCNC: 4.4 G/DL
ALP BLD-CCNC: 119 U/L
ALT SERPL-CCNC: 14 U/L
ANION GAP SERPL CALC-SCNC: 15 MMOL/L
AST SERPL-CCNC: 18 U/L
BILIRUB SERPL-MCNC: 0.4 MG/DL
BUN SERPL-MCNC: 13 MG/DL
CALCIUM SERPL-MCNC: 9.3 MG/DL
CHLORIDE SERPL-SCNC: 100 MMOL/L
CO2 SERPL-SCNC: 25 MMOL/L
CREAT SERPL-MCNC: 0.8 MG/DL
CRP SERPL-MCNC: 9 MG/L
ERYTHROCYTE [SEDIMENTATION RATE] IN BLOOD BY WESTERGREN METHOD: 30 MM/HR
GLUCOSE SERPL-MCNC: 104 MG/DL
POTASSIUM SERPL-SCNC: 4 MMOL/L
PROT SERPL-MCNC: 6.8 G/DL
SODIUM SERPL-SCNC: 140 MMOL/L

## 2021-05-27 ENCOUNTER — APPOINTMENT (OUTPATIENT)
Dept: RHEUMATOLOGY | Facility: CLINIC | Age: 81
End: 2021-05-27
Payer: MEDICARE

## 2021-05-27 VITALS
RESPIRATION RATE: 24 BRPM | SYSTOLIC BLOOD PRESSURE: 128 MMHG | HEIGHT: 60 IN | TEMPERATURE: 98.3 F | WEIGHT: 212 LBS | DIASTOLIC BLOOD PRESSURE: 78 MMHG | HEART RATE: 73 BPM | OXYGEN SATURATION: 94 % | BODY MASS INDEX: 41.62 KG/M2

## 2021-05-27 DIAGNOSIS — M79.7 FIBROMYALGIA: ICD-10-CM

## 2021-05-27 DIAGNOSIS — S32.049A UNSPECIFIED FRACTURE OF FOURTH LUMBAR VERTEBRA, INITIAL ENCOUNTER FOR CLOSED FRACTURE: ICD-10-CM

## 2021-05-27 DIAGNOSIS — M35.3 POLYMYALGIA RHEUMATICA: ICD-10-CM

## 2021-05-27 PROCEDURE — 99214 OFFICE O/P EST MOD 30 MIN: CPT

## 2021-05-27 PROCEDURE — 99072 ADDL SUPL MATRL&STAF TM PHE: CPT

## 2021-05-29 PROBLEM — M79.7 FIBROMYALGIA: Status: ACTIVE | Noted: 2021-05-07

## 2021-05-29 PROBLEM — S32.049A L4 VERTEBRAL FRACTURE: Status: ACTIVE | Noted: 2021-04-22

## 2021-05-29 NOTE — PHYSICAL EXAM
[Sclera] : the sclera and conjunctiva were normal [Auscultation Breath Sounds / Voice Sounds] : lungs were clear to auscultation bilaterally [Heart Sounds] : normal S1 and S2 [Heart Sounds Gallop] : no gallops [Murmurs] : no murmurs [Heart Sounds Pericardial Friction Rub] : no pericardial rub [Abdomen Tenderness] : non-tender [Musculoskeletal - Swelling] : no joint swelling seen [] : no rash [Oriented To Time, Place, And Person] : oriented to person, place, and time [FreeTextEntry1] : multiple tender points

## 2021-05-29 NOTE — ASSESSMENT
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 \par =AMS, dementia? \par \par Still question dx of PMR, and will continue lowering steroids. \par \par Pt w AMS w waxing and waning mental status. Unsure of etiology. Unlikely to be adrenal insuffiency, given no HTN, hyponatremia, hyperkalemia. \par \par Plan\par -Labs w inflammatory markers\par -lower to prednisone 2.5mg\par -f.u neuro work up \par RTO 1 month\par

## 2021-05-29 NOTE — HISTORY OF PRESENT ILLNESS
[___ Month(s) Ago] : [unfilled] month(s) ago [FreeTextEntry1] : 80 y/o M w PMR? L4 compression fx, here for f/u visit\par \par #PMR?\par =dx 2018\par =questionable dx, daughter and pt do not remember specifics of symptoms (if shoulder/hip pain)\par =has been on prednisone 10mg or higher for over 3 years\par =only persistent pain is back pain\par \par #Compression fracture of L4\par =back pain since early 2021\par =was told he has neuropathy? Sciatica? Was given injections\par =Xrays 4/21 w compression fracture of L4\par =DEXA scan 4/21 w normal BMD\par =pt follows w ortho \par \par #COPD\par =managed by pulm\par =cannot tolerate respiratory depressants\par =hx of gabapentin use and respiratory failure requiring intubation\par \par #hx of gastritis on PPI \par

## 2021-06-02 ENCOUNTER — APPOINTMENT (OUTPATIENT)
Dept: PULMONOLOGY | Facility: CLINIC | Age: 81
End: 2021-06-02
Payer: MEDICARE

## 2021-06-02 VITALS
BODY MASS INDEX: 41.23 KG/M2 | WEIGHT: 210 LBS | HEIGHT: 60 IN | RESPIRATION RATE: 18 BRPM | TEMPERATURE: 98 F | SYSTOLIC BLOOD PRESSURE: 130 MMHG | HEART RATE: 74 BPM | OXYGEN SATURATION: 93 % | DIASTOLIC BLOOD PRESSURE: 80 MMHG

## 2021-06-02 DIAGNOSIS — R41.0 DISORIENTATION, UNSPECIFIED: ICD-10-CM

## 2021-06-02 DIAGNOSIS — F32.9 MAJOR DEPRESSIVE DISORDER, SINGLE EPISODE, UNSPECIFIED: ICD-10-CM

## 2021-06-02 PROCEDURE — 99213 OFFICE O/P EST LOW 20 MIN: CPT

## 2021-06-02 PROCEDURE — 99072 ADDL SUPL MATRL&STAF TM PHE: CPT

## 2021-06-02 NOTE — REVIEW OF SYSTEMS
[SOB on Exertion] : sob on exertion [Arthralgias] : arthralgias [Depression] : depression [Negative] : Endocrine [Cough] : no cough [Sputum] : no sputum [Headache] : no headache [Dizziness] : no dizziness [Confusion] : no confusion

## 2021-06-02 NOTE — DISCUSSION/SUMMARY
[FreeTextEntry1] : 82 yo male with SOURAV at baseline with nocturnal CPAP use with supplemental oxygen. Continued compliance stressed. The patient's mental status il likely due to early dementia with depression. I had a very long discussion with the patient and his daughter who was present. He appears to understand. Zoloft dose can be increased to 100 mg daily.He is to follow up with his PMD.

## 2021-06-02 NOTE — HISTORY OF PRESENT ILLNESS
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 As per the patient's daughter who was present, the patient was lethargic and confused after the Pfeizer vaccine but presently is at baseline, but continues to talk of world wide doom and despair with conspiracy theories. His prednisone dose was decreased to 2.5 mg daily after it was decided that he does not have PMR. He also complained of low back pain, noted to have L4 fracture. He denies cough, chest pain or hemoptysis on PRN neb tx and nocturnal oxygen. [YearQuit] : 2000 [TextBox_29] : Denies snoring, daytime somnolence, apneic episodes, AM headaches

## 2021-06-03 LAB
ALBUMIN SERPL ELPH-MCNC: 4.3 G/DL
ALP BLD-CCNC: 123 U/L
ALT SERPL-CCNC: 16 U/L
ANION GAP SERPL CALC-SCNC: 14 MMOL/L
AST SERPL-CCNC: 16 U/L
BASOPHILS # BLD AUTO: 0.08 K/UL
BASOPHILS NFR BLD AUTO: 0.5 %
BILIRUB SERPL-MCNC: 0.3 MG/DL
BUN SERPL-MCNC: 17 MG/DL
CALCIUM SERPL-MCNC: 9.6 MG/DL
CHLORIDE SERPL-SCNC: 103 MMOL/L
CO2 SERPL-SCNC: 27 MMOL/L
CREAT SERPL-MCNC: 0.73 MG/DL
CRP SERPL-MCNC: 13 MG/L
EOSINOPHIL # BLD AUTO: 0.34 K/UL
EOSINOPHIL NFR BLD AUTO: 2.3 %
ERYTHROCYTE [SEDIMENTATION RATE] IN BLOOD BY WESTERGREN METHOD: 35 MM/HR
GLUCOSE SERPL-MCNC: 118 MG/DL
HCT VFR BLD CALC: 41.3 %
HGB BLD-MCNC: 13.3 G/DL
IMM GRANULOCYTES NFR BLD AUTO: 0.8 %
LYMPHOCYTES # BLD AUTO: 1.87 K/UL
LYMPHOCYTES NFR BLD AUTO: 12.6 %
MAN DIFF?: NORMAL
MCHC RBC-ENTMCNC: 30 PG
MCHC RBC-ENTMCNC: 32.2 GM/DL
MCV RBC AUTO: 93.2 FL
MONOCYTES # BLD AUTO: 0.69 K/UL
MONOCYTES NFR BLD AUTO: 4.6 %
NEUTROPHILS # BLD AUTO: 11.78 K/UL
NEUTROPHILS NFR BLD AUTO: 79.2 %
PLATELET # BLD AUTO: 381 K/UL
POTASSIUM SERPL-SCNC: 4.2 MMOL/L
PROT SERPL-MCNC: 6.7 G/DL
RBC # BLD: 4.43 M/UL
RBC # FLD: 13.4 %
SODIUM SERPL-SCNC: 143 MMOL/L
WBC # FLD AUTO: 14.88 K/UL

## 2021-06-11 ENCOUNTER — APPOINTMENT (OUTPATIENT)
Dept: ORTHOPEDIC SURGERY | Facility: CLINIC | Age: 81
End: 2021-06-11

## 2021-06-23 ENCOUNTER — APPOINTMENT (OUTPATIENT)
Dept: RHEUMATOLOGY | Facility: CLINIC | Age: 81
End: 2021-06-23

## 2021-09-15 ENCOUNTER — APPOINTMENT (OUTPATIENT)
Dept: PULMONOLOGY | Facility: CLINIC | Age: 81
End: 2021-09-15
Payer: MEDICARE

## 2021-09-15 VITALS
OXYGEN SATURATION: 92 % | SYSTOLIC BLOOD PRESSURE: 135 MMHG | DIASTOLIC BLOOD PRESSURE: 72 MMHG | WEIGHT: 209 LBS | HEART RATE: 60 BPM | BODY MASS INDEX: 39.46 KG/M2 | TEMPERATURE: 98.8 F | HEIGHT: 61 IN

## 2021-09-15 PROCEDURE — 99213 OFFICE O/P EST LOW 20 MIN: CPT

## 2021-12-13 ENCOUNTER — APPOINTMENT (OUTPATIENT)
Dept: PULMONOLOGY | Facility: CLINIC | Age: 81
End: 2021-12-13
Payer: MEDICARE

## 2021-12-13 VITALS
TEMPERATURE: 98 F | DIASTOLIC BLOOD PRESSURE: 72 MMHG | RESPIRATION RATE: 24 BRPM | WEIGHT: 222 LBS | HEART RATE: 59 BPM | HEIGHT: 61 IN | OXYGEN SATURATION: 90 % | SYSTOLIC BLOOD PRESSURE: 151 MMHG | BODY MASS INDEX: 41.91 KG/M2

## 2021-12-13 DIAGNOSIS — R06.00 DYSPNEA, UNSPECIFIED: ICD-10-CM

## 2021-12-13 PROCEDURE — 99213 OFFICE O/P EST LOW 20 MIN: CPT

## 2021-12-13 RX ORDER — OMEPRAZOLE 40 MG/1
40 CAPSULE, DELAYED RELEASE ORAL
Refills: 0 | Status: ACTIVE | COMMUNITY

## 2021-12-13 RX ORDER — PREDNISONE 2.5 MG/1
2.5 TABLET ORAL DAILY
Qty: 90 | Refills: 0 | Status: COMPLETED | COMMUNITY
Start: 2021-05-27 | End: 2021-12-13

## 2021-12-13 RX ORDER — FUROSEMIDE 20 MG/1
20 TABLET ORAL DAILY
Qty: 30 | Refills: 0 | Status: COMPLETED | COMMUNITY
Start: 2020-09-23 | End: 2021-12-13

## 2021-12-13 RX ORDER — PREDNISONE 2.5 MG/1
2.5 TABLET ORAL
Qty: 90 | Refills: 1 | Status: COMPLETED | COMMUNITY
Start: 2021-04-13 | End: 2021-12-13

## 2022-01-02 NOTE — HISTORY OF PRESENT ILLNESS
[>= 30 pack years] : >= 30 pack years [TextBox_4] : 80 yo male with hx of SOURAV and mixed abnormality on PFT , presents for follow up. The patient is compliant with CPAP and oxygen use. He complains of PRN FIGUEROA without cough or chest pain. He uses PRN albuterol alone with 5 mg prednisone daily. [TextBox_29] : Denies snoring, daytime somnolence, apneic episodes, AM headaches [YearQuit] : 2000

## 2022-01-02 NOTE — DISCUSSION/SUMMARY
[FreeTextEntry1] : 80 yo male with hx of SOURAV and mixed abnormality on PFT, at baseline. Continued use of CPAP and supplemental oxygen as before.He is to follow up with his PMD as before.

## 2022-06-01 ENCOUNTER — APPOINTMENT (OUTPATIENT)
Dept: PULMONOLOGY | Facility: CLINIC | Age: 82
End: 2022-06-01
Payer: MEDICARE

## 2022-06-01 VITALS
DIASTOLIC BLOOD PRESSURE: 72 MMHG | HEART RATE: 72 BPM | HEIGHT: 63 IN | SYSTOLIC BLOOD PRESSURE: 132 MMHG | OXYGEN SATURATION: 91 % | WEIGHT: 230 LBS | TEMPERATURE: 98 F | BODY MASS INDEX: 40.75 KG/M2

## 2022-06-01 PROCEDURE — 99214 OFFICE O/P EST MOD 30 MIN: CPT

## 2022-06-03 NOTE — HISTORY OF PRESENT ILLNESS
[Current] : current [>= 20 pack years] : >= 20 pack years [TextBox_4] : 81 yo male with hx of SOURAV and mixed abnormality on PFT, presents for follow up. The patient is "OK" on prednisone 5 mg daily with PRN albuterol MDI and neb. He complains of recent increase of nocturnal productive cough without chest pain or hemoptysis.He has GERD on treatment. He is compliant with CPAP use and supplemental oxygen. [YearQuit] : 2000 [TextBox_29] : Denies snoring, daytime somnolence, apneic episodes, AM headaches

## 2022-06-03 NOTE — DISCUSSION/SUMMARY
[FreeTextEntry1] : 83 yo male with hx of SOURAV and mixed PFT abnormality, at baseline with supplemental oxygen and bronchodilators. Continued PAP use stressed. Diet and weight loss stressed. He is to follow up with his PMD as before. His daughter was present.

## 2022-06-03 NOTE — PHYSICAL EXAM
[No Acute Distress] : no acute distress [Normal Oropharynx] : normal oropharynx [Normal Appearance] : normal appearance [No Neck Mass] : no neck mass [Normal Rate/Rhythm] : normal rate/rhythm [Normal S1, S2] : normal s1, s2 [No Murmurs] : no murmurs [No Resp Distress] : no resp distress [Rales] : rales [No Abnormalities] : no abnormalities [Benign] : benign [No Clubbing] : no clubbing [Normal Gait] : normal gait [No Cyanosis] : no cyanosis [No Edema] : no edema [FROM] : FROM [Normal Color/ Pigmentation] : normal color/ pigmentation [No Focal Deficits] : no focal deficits [Oriented x3] : oriented x3 [Normal Affect] : normal affect

## 2022-06-03 NOTE — REVIEW OF SYSTEMS
[Cough] : cough [Sputum] : sputum [SOB on Exertion] : sob on exertion [Arthralgias] : arthralgias [Headache] : no headache [Dizziness] : no dizziness [Confusion] : no confusion [Depression] : depression [Negative] : Endocrine

## 2022-09-21 ENCOUNTER — APPOINTMENT (OUTPATIENT)
Dept: PULMONOLOGY | Facility: CLINIC | Age: 82
End: 2022-09-21

## 2022-09-21 VITALS
SYSTOLIC BLOOD PRESSURE: 135 MMHG | WEIGHT: 230 LBS | OXYGEN SATURATION: 83 % | BODY MASS INDEX: 40.74 KG/M2 | TEMPERATURE: 98.7 F | DIASTOLIC BLOOD PRESSURE: 61 MMHG | HEART RATE: 68 BPM

## 2022-09-21 PROCEDURE — 99214 OFFICE O/P EST MOD 30 MIN: CPT

## 2022-09-26 ENCOUNTER — RX RENEWAL (OUTPATIENT)
Age: 82
End: 2022-09-26

## 2022-09-27 RX ORDER — PREDNISONE 5 MG/1
5 TABLET ORAL
Qty: 100 | Refills: 2 | Status: ACTIVE | COMMUNITY
Start: 2020-12-29 | End: 1900-01-01

## 2022-10-24 ENCOUNTER — APPOINTMENT (OUTPATIENT)
Dept: PULMONOLOGY | Facility: CLINIC | Age: 82
End: 2022-10-24

## 2022-10-24 VITALS
BODY MASS INDEX: 39.34 KG/M2 | HEART RATE: 64 BPM | SYSTOLIC BLOOD PRESSURE: 124 MMHG | WEIGHT: 222 LBS | DIASTOLIC BLOOD PRESSURE: 64 MMHG | OXYGEN SATURATION: 93 % | HEIGHT: 63 IN

## 2022-10-24 DIAGNOSIS — U09.9 POST COVID-19 CONDITION, UNSPECIFIED: ICD-10-CM

## 2022-10-24 PROCEDURE — 99214 OFFICE O/P EST MOD 30 MIN: CPT

## 2022-10-24 RX ORDER — LOSARTAN POTASSIUM AND HYDROCHLOROTHIAZIDE 12.5; 1 MG/1; MG/1
100-12.5 TABLET ORAL
Qty: 90 | Refills: 0 | Status: ACTIVE | COMMUNITY
Start: 2022-10-01

## 2022-10-24 RX ORDER — NYSTATIN AND TRIAMCINOLONE ACETONIDE 100000; 1 [USP'U]/G; MG/G
100000-0.1 OINTMENT TOPICAL
Qty: 30 | Refills: 0 | Status: ACTIVE | COMMUNITY
Start: 2022-10-03

## 2022-10-24 RX ORDER — PANTOPRAZOLE 40 MG/1
40 TABLET, DELAYED RELEASE ORAL
Qty: 90 | Refills: 0 | Status: ACTIVE | COMMUNITY
Start: 2022-10-05

## 2022-10-24 RX ORDER — PREDNISONE 1 MG/1
1 TABLET ORAL
Qty: 120 | Refills: 0 | Status: ACTIVE | COMMUNITY
Start: 2022-10-12

## 2022-10-24 NOTE — HISTORY OF PRESENT ILLNESS
[Former] : former [>= 20 pack years] : >= 20 pack years [TextBox_4] : 81 yo male with hx of SOURAV on PAP treatment , mixed abnormality on PFT presents for follow up. The patient was covid 19 positive last month, treated symptomatically at home.He complained of body aches and SOB with mild cough. Presently he feels better continuing to use supplemental oxygen and inhaled meds. He denies fever, chest pain or hemoptysis. [YearQuit] : 2000 [TextBox_29] : Denies snoring, daytime somnolence, apneic episodes, AM headaches

## 2022-10-24 NOTE — DISCUSSION/SUMMARY
[FreeTextEntry1] : 83 yo male with stable pulmonary exam post covid 19 infection. He is to continue supplemental oxygen and inhaled meds as before. He has received the influenza vaccine. He is to follow up with his PMD as before. His wife was present.

## 2022-10-30 NOTE — HISTORY OF PRESENT ILLNESS
[Former] : former [>= 20 pack years] : >= 20 pack years [TextBox_4] : 81 yo male with hx of SOURAV and mixed abnormality on PFT, presents for follow up. The patient is "OK" compliant with CPAP use. He continues to use nebulized meds and supplemental oxygen. He is compliant with CPAP use. [YearQuit] : 2000 [TextBox_29] : Denies snoring, daytime somnolence, apneic episodes, AM headaches

## 2022-10-30 NOTE — DISCUSSION/SUMMARY
[FreeTextEntry1] : 83 yo male with hx of SOURAV compliant with CPAP use. Respiratory status appears to be at baseline on neb treatments and supplemental oxygen. He is to follow up with his PMD as before and for the influenza vaccine. His daughter was present.

## 2022-10-30 NOTE — PHYSICAL EXAM
[No Acute Distress] : no acute distress [Normal Oropharynx] : normal oropharynx [Normal Appearance] : normal appearance [No Neck Mass] : no neck mass [Normal Rate/Rhythm] : normal rate/rhythm [Normal S1, S2] : normal s1, s2 [No Murmurs] : no murmurs [No Resp Distress] : no resp distress [Rales] : rales [No Abnormalities] : no abnormalities [Benign] : benign [No Clubbing] : no clubbing [No Cyanosis] : no cyanosis [No Edema] : no edema [FROM] : FROM [Normal Color/ Pigmentation] : normal color/ pigmentation [No Focal Deficits] : no focal deficits [Oriented x3] : oriented x3 [Normal Affect] : normal affect [TextBox_68] : Decreased breath sounds bilaterally [TextBox_99] : ambulates with cane

## 2022-12-06 ENCOUNTER — APPOINTMENT (OUTPATIENT)
Dept: PULMONOLOGY | Facility: CLINIC | Age: 82
End: 2022-12-06
Payer: MEDICARE

## 2022-12-06 VITALS
TEMPERATURE: 99.3 F | SYSTOLIC BLOOD PRESSURE: 160 MMHG | HEART RATE: 74 BPM | DIASTOLIC BLOOD PRESSURE: 60 MMHG | OXYGEN SATURATION: 83 %

## 2022-12-06 PROCEDURE — 99214 OFFICE O/P EST MOD 30 MIN: CPT

## 2022-12-06 RX ORDER — PREDNISONE 20 MG/1
20 TABLET ORAL
Qty: 10 | Refills: 0 | Status: ACTIVE | COMMUNITY
Start: 2022-12-06 | End: 1900-01-01

## 2022-12-19 ENCOUNTER — APPOINTMENT (OUTPATIENT)
Dept: PULMONOLOGY | Facility: CLINIC | Age: 82
End: 2022-12-19
Payer: MEDICARE

## 2022-12-19 VITALS
DIASTOLIC BLOOD PRESSURE: 80 MMHG | HEIGHT: 63 IN | SYSTOLIC BLOOD PRESSURE: 150 MMHG | OXYGEN SATURATION: 92 % | HEART RATE: 65 BPM | TEMPERATURE: 98 F | RESPIRATION RATE: 18 BRPM

## 2022-12-19 DIAGNOSIS — J20.9 ACUTE BRONCHITIS, UNSPECIFIED: ICD-10-CM

## 2022-12-19 PROCEDURE — 99214 OFFICE O/P EST MOD 30 MIN: CPT

## 2022-12-19 RX ORDER — PREDNISONE 20 MG/1
20 TABLET ORAL
Qty: 10 | Refills: 0 | Status: ACTIVE | COMMUNITY
Start: 2022-12-19 | End: 1900-01-01

## 2023-02-12 PROBLEM — J20.9 ACUTE BRONCHITIS WITH OBSTRUCTION: Status: RESOLVED | Noted: 2023-02-12 | Resolved: 2023-03-14

## 2023-02-12 NOTE — HISTORY OF PRESENT ILLNESS
[Former] : former [>= 20 pack years] : >= 20 pack years [TextBox_4] : 81 yo male with hx of SOURAV on PAP treatment and mixed abnormality on PFT with continuous supplemental oxygen use, presents complaining of recurrence of productive cough with SOB and wheezing. He denies fever, chest pain or hemoptysis. [YearQuit] : 2000 [TextBox_29] : Denies snoring, daytime somnolence, apneic episodes, AM headaches

## 2023-02-12 NOTE — DISCUSSION/SUMMARY
[FreeTextEntry1] : 81 yo male with OAD related complaints. Prednisone 40 mg daily for five days prescribed. He was given a two week sample of trelegy 100 . He is to continue nebulized meds as before. Continued supplemental oxygen and CPAP use as before. Follow up with his PMD.

## 2023-02-12 NOTE — DISCUSSION/SUMMARY
[FreeTextEntry1] : 83 yo male with OAD related complaints. Prednisone again prescribed along with zpack. He is to increase supplemental oxygen to 4 l/m. He is also to increase CPAP use. Follow up with his PMD as before. His daughter was present.

## 2023-02-12 NOTE — HISTORY OF PRESENT ILLNESS
[Former] : former [>= 20 pack years] : >= 20 pack years [TextBox_4] : 81 yo male with hx of SOURAV compliant with CPAP use and mixed abnormality on PFT on continuous supplemental oxygen, presents complaining of SOB and productive cough for two weeks. He denies fever, chest pain or hemoptysis.He was seen in a walk in clinic, with negative chest xray. He uses nebulized albuterol PRN.  [YearQuit] : 2000 [TextBox_29] : Denies snoring, daytime somnolence, apneic episodes, AM headaches

## 2023-02-12 NOTE — REVIEW OF SYSTEMS
[Cough] : cough [Sputum] : sputum [Wheezing] : wheezing [SOB on Exertion] : sob on exertion [Arthralgias] : arthralgias [Headache] : no headache [Dizziness] : no dizziness [Confusion] : no confusion [Depression] : depression [Negative] : Endocrine

## 2023-02-13 ENCOUNTER — APPOINTMENT (OUTPATIENT)
Dept: PULMONOLOGY | Facility: CLINIC | Age: 83
End: 2023-02-13
Payer: MEDICARE

## 2023-02-13 VITALS
SYSTOLIC BLOOD PRESSURE: 134 MMHG | HEART RATE: 66 BPM | WEIGHT: 225 LBS | RESPIRATION RATE: 24 BRPM | BODY MASS INDEX: 39.87 KG/M2 | DIASTOLIC BLOOD PRESSURE: 68 MMHG | HEIGHT: 63 IN | OXYGEN SATURATION: 90 %

## 2023-02-13 DIAGNOSIS — R05.9 COUGH, UNSPECIFIED: ICD-10-CM

## 2023-02-13 PROCEDURE — 99214 OFFICE O/P EST MOD 30 MIN: CPT

## 2023-02-13 RX ORDER — AZITHROMYCIN 250 MG/1
250 TABLET, FILM COATED ORAL
Qty: 1 | Refills: 0 | Status: COMPLETED | COMMUNITY
Start: 2022-12-19 | End: 2023-02-13

## 2023-04-25 NOTE — CONSULT LETTER
[Dear  ___] : Dear  [unfilled], [Courtesy Letter:] : I had the pleasure of seeing your patient, [unfilled], in my office today. [Please see my note below.] : Please see my note below. [Consult Closing:] : Thank you very much for allowing me to participate in the care of this patient.  If you have any questions, please do not hesitate to contact me. [Sincerely,] : Sincerely, no

## 2023-04-26 ENCOUNTER — APPOINTMENT (OUTPATIENT)
Dept: PULMONOLOGY | Facility: CLINIC | Age: 83
End: 2023-04-26
Payer: MEDICARE

## 2023-04-26 VITALS
RESPIRATION RATE: 18 BRPM | BODY MASS INDEX: 39.87 KG/M2 | HEIGHT: 63 IN | DIASTOLIC BLOOD PRESSURE: 70 MMHG | SYSTOLIC BLOOD PRESSURE: 140 MMHG | TEMPERATURE: 98 F | OXYGEN SATURATION: 91 % | HEART RATE: 70 BPM | WEIGHT: 225 LBS

## 2023-04-26 DIAGNOSIS — J98.4 EMPHYSEMA, UNSPECIFIED: ICD-10-CM

## 2023-04-26 DIAGNOSIS — G47.33 OBSTRUCTIVE SLEEP APNEA (ADULT) (PEDIATRIC): ICD-10-CM

## 2023-04-26 DIAGNOSIS — J43.9 EMPHYSEMA, UNSPECIFIED: ICD-10-CM

## 2023-04-26 PROBLEM — R05.9 COUGH: Status: ACTIVE | Noted: 2023-02-12

## 2023-04-26 PROCEDURE — 94060 EVALUATION OF WHEEZING: CPT

## 2023-04-26 PROCEDURE — 99213 OFFICE O/P EST LOW 20 MIN: CPT | Mod: 25

## 2023-04-26 PROCEDURE — 94729 DIFFUSING CAPACITY: CPT

## 2023-04-26 PROCEDURE — 94727 GAS DIL/WSHOT DETER LNG VOL: CPT

## 2023-04-26 NOTE — REVIEW OF SYSTEMS
[Cough] : no cough [Sputum] : no sputum [Wheezing] : wheezing [SOB on Exertion] : sob on exertion [Arthralgias] : arthralgias [Headache] : no headache [Dizziness] : no dizziness [Confusion] : no confusion [Depression] : depression [Negative] : Endocrine

## 2023-04-26 NOTE — DISCUSSION/SUMMARY
[FreeTextEntry1] : 83 yo male with hx of SOURAV compliant with PAP use. He is to continue neb treatments and supplemental oxygen. There is no pulmonary contraindication for planned cataract  surgery.

## 2023-04-26 NOTE — PHYSICAL EXAM
[No Acute Distress] : no acute distress [Normal Oropharynx] : normal oropharynx [Normal Appearance] : normal appearance [No Neck Mass] : no neck mass [Normal Rate/Rhythm] : normal rate/rhythm [Normal S1, S2] : normal s1, s2 [No Murmurs] : no murmurs [No Resp Distress] : no resp distress [Wheeze] : wheeze [Rales] : rales [No Abnormalities] : no abnormalities [Benign] : benign [No Clubbing] : no clubbing [No Cyanosis] : no cyanosis [No Edema] : no edema [FROM] : FROM [Normal Color/ Pigmentation] : normal color/ pigmentation [No Focal Deficits] : no focal deficits [Oriented x3] : oriented x3 [Normal Affect] : normal affect [TextBox_2] : Increased BMI [TextBox_68] : Decreased breath sounds bilaterally [TextBox_99] : ambulates with cane

## 2023-04-26 NOTE — HISTORY OF PRESENT ILLNESS
[Former] : former [>= 20 pack years] : >= 20 pack years [TextBox_4] : 81 yo male with hx of SOURAV on PAP and supplemental oxygen, presents for follow up.He is compliant with PAP use.He denies sleep related complaints. The patient feels "better" on nebulized albuterol PRN. He complains of PRN wheezing without cough or chest pain. [YearQuit] : 2000 [TextBox_29] : Denies snoring, daytime somnolence, apneic episodes, AM headaches

## 2023-06-30 ENCOUNTER — APPOINTMENT (OUTPATIENT)
Dept: FAMILY MEDICINE | Facility: CLINIC | Age: 83
End: 2023-06-30

## 2023-07-09 PROBLEM — G47.33 OSA (OBSTRUCTIVE SLEEP APNEA): Status: ACTIVE | Noted: 2017-03-20

## 2023-07-09 PROBLEM — J43.9 MIXED RESTRICTIVE AND OBSTRUCTIVE LUNG DISEASE: Status: ACTIVE | Noted: 2019-06-17

## 2023-07-09 NOTE — DISCUSSION/SUMMARY
[FreeTextEntry1] : 83-year-old male with history of SOURAV and OAD at baseline.  The PFT effort was suboptimal today.  He is to continue his nebulized medication as before as well as supplemental oxygen.  Use of nocturnal PAP as before was stressed.  He is to follow-up with his PMD as before.  His wife was present.

## 2023-07-09 NOTE — PHYSICAL EXAM
[No Acute Distress] : no acute distress [Normal Oropharynx] : normal oropharynx [Normal Appearance] : normal appearance [No Neck Mass] : no neck mass [Normal S1, S2] : normal s1, s2 [Normal Rate/Rhythm] : normal rate/rhythm [No Murmurs] : no murmurs [No Resp Distress] : no resp distress [Wheeze] : wheeze [Rales] : rales [Benign] : benign [No Abnormalities] : no abnormalities [No Clubbing] : no clubbing [No Cyanosis] : no cyanosis [No Edema] : no edema [FROM] : FROM [Normal Color/ Pigmentation] : normal color/ pigmentation [No Focal Deficits] : no focal deficits [Oriented x3] : oriented x3 [Normal Affect] : normal affect [TextBox_2] : Increased BMI [TextBox_68] : Decreased breath sounds bilaterally [TextBox_99] : ambulates with cane

## 2023-07-09 NOTE — HISTORY OF PRESENT ILLNESS
[Former] : former [>= 20 pack years] : >= 20 pack years [YearQuit] : 2000 [TextBox_4] : 84 yo male with hx of SOURAV and mixed disease on PFT presents for follow up. The patient is "OK" on continuous supplemental oxygen. He complains of FIGUEROA without chest pain or hemoptysis. He uses nebulized bronchodilators and recently decreased prednisone to 2 mg daily. He is compliant with PAP use. He denies sleep related complaints. [TextBox_29] : Denies snoring, daytime somnolence, apneic episodes, AM headaches

## 2023-09-20 ENCOUNTER — APPOINTMENT (OUTPATIENT)
Dept: PULMONOLOGY | Facility: CLINIC | Age: 83
End: 2023-09-20
Payer: MEDICARE

## 2023-09-20 VITALS
DIASTOLIC BLOOD PRESSURE: 70 MMHG | WEIGHT: 227 LBS | RESPIRATION RATE: 18 BRPM | HEART RATE: 64 BPM | TEMPERATURE: 98 F | OXYGEN SATURATION: 87 % | SYSTOLIC BLOOD PRESSURE: 146 MMHG | HEIGHT: 63 IN | BODY MASS INDEX: 40.22 KG/M2

## 2023-09-20 VITALS — OXYGEN SATURATION: 91 %

## 2023-09-20 PROCEDURE — 99213 OFFICE O/P EST LOW 20 MIN: CPT

## 2023-09-25 ENCOUNTER — APPOINTMENT (OUTPATIENT)
Dept: PULMONOLOGY | Facility: CLINIC | Age: 83
End: 2023-09-25
Payer: MEDICARE

## 2023-09-25 PROCEDURE — 94060 EVALUATION OF WHEEZING: CPT

## 2023-09-25 PROCEDURE — 94729 DIFFUSING CAPACITY: CPT

## 2023-09-25 PROCEDURE — 94727 GAS DIL/WSHOT DETER LNG VOL: CPT

## 2023-12-13 ENCOUNTER — APPOINTMENT (OUTPATIENT)
Dept: PULMONOLOGY | Facility: CLINIC | Age: 83
End: 2023-12-13
Payer: MEDICARE

## 2023-12-13 VITALS
WEIGHT: 233 LBS | BODY MASS INDEX: 41.29 KG/M2 | OXYGEN SATURATION: 71 % | DIASTOLIC BLOOD PRESSURE: 71 MMHG | SYSTOLIC BLOOD PRESSURE: 149 MMHG | HEIGHT: 63 IN | HEART RATE: 74 BPM

## 2023-12-13 PROCEDURE — 99213 OFFICE O/P EST LOW 20 MIN: CPT

## 2023-12-16 NOTE — DISCUSSION/SUMMARY
[FreeTextEntry1] : 83-year-old male with history of SOURAV and mixed abnormality on PFTs at baseline on supplemental oxygen.  He is to continue PAP use as before.  He is also continue use of his inhaled meds.  He is to follow-up with his PMD as before.  His wife was present.

## 2023-12-16 NOTE — PHYSICAL EXAM
[No Acute Distress] : no acute distress [Normal Oropharynx] : normal oropharynx [Normal Appearance] : normal appearance [No Neck Mass] : no neck mass [Normal Rate/Rhythm] : normal rate/rhythm [Normal S1, S2] : normal s1, s2 [No Murmurs] : no murmurs [No Resp Distress] : no resp distress [Clear to Auscultation Bilaterally] : clear to auscultation bilaterally [Wheeze] : wheeze [Rales] : rales [No Abnormalities] : no abnormalities [Benign] : benign [Normal Gait] : normal gait [No Clubbing] : no clubbing [No Cyanosis] : no cyanosis [No Edema] : no edema [FROM] : FROM [Normal Color/ Pigmentation] : normal color/ pigmentation [No Focal Deficits] : no focal deficits [Oriented x3] : oriented x3 [Normal Affect] : normal affect [TextBox_2] : Increased BMI [TextBox_99] : ambulates with canes [TextBox_68] : Decreased breath sounds bilaterally

## 2023-12-16 NOTE — REVIEW OF SYSTEMS
[Cough] : no cough [Sputum] : no sputum [Wheezing] : no wheezing [SOB on Exertion] : sob on exertion [Arthralgias] : arthralgias [Headache] : no headache [Dizziness] : no dizziness [Confusion] : no confusion [Depression] : depression [Negative] : Endocrine

## 2023-12-16 NOTE — HISTORY OF PRESENT ILLNESS
[>= 20 pack years] : >= 20 pack years [Former] : former [TextBox_4] : 83-year-old male with history of SOURAV on PAP treatment with mixed abnormality on PFTs on supplemental oxygen, presents for follow-up.  Patient is "okay" compliant with PAP and oxygen use.  Since last visit he underwent cataract surgery without any respiratory issues.  Presently denies cough, chest pain or hemoptysis but does complain of dyspnea on exertion. [YearQuit] : 2000 [TextBox_29] : Denies snoring, daytime somnolence, apneic episodes, AM headaches

## 2023-12-16 NOTE — CONSULT LETTER
[Dear  ___] : Dear  [unfilled], [Courtesy Letter:] : I had the pleasure of seeing your patient, [unfilled], in my office today. [Please see my note below.] : Please see my note below. [Consult Closing:] : Thank you very much for allowing me to participate in the care of this patient.  If you have any questions, please do not hesitate to contact me. [Sincerely,] : Sincerely, [FreeTextEntry3] : NAYANA BYRNE MD  [address], [address] [place] [address]

## 2024-03-26 RX ORDER — ROSUVASTATIN CALCIUM 5 MG/1
10 TABLET ORAL
Qty: 0 | Refills: 0 | DISCHARGE

## 2024-03-26 RX ORDER — TAMSULOSIN HYDROCHLORIDE 0.4 MG/1
0 CAPSULE ORAL
Qty: 0 | Refills: 0 | DISCHARGE

## 2024-03-26 RX ORDER — ASPIRIN/CALCIUM CARB/MAGNESIUM 324 MG
1 TABLET ORAL
Qty: 0 | Refills: 0 | DISCHARGE

## 2024-03-26 RX ORDER — TAMSULOSIN HYDROCHLORIDE 0.4 MG/1
0.4 CAPSULE ORAL
Qty: 0 | Refills: 0 | DISCHARGE

## 2024-03-26 RX ORDER — CLOPIDOGREL BISULFATE 75 MG/1
75 TABLET, FILM COATED ORAL
Qty: 0 | Refills: 0 | DISCHARGE

## 2024-03-26 RX ORDER — CLOPIDOGREL BISULFATE 75 MG/1
0 TABLET, FILM COATED ORAL
Qty: 0 | Refills: 0 | DISCHARGE

## 2024-03-26 RX ORDER — LOSARTAN POTASSIUM 100 MG/1
125 TABLET, FILM COATED ORAL
Qty: 0 | Refills: 0 | DISCHARGE

## 2024-03-26 RX ORDER — ALBUTEROL 90 UG/1
0 AEROSOL, METERED ORAL
Qty: 0 | Refills: 0 | DISCHARGE

## 2024-03-26 RX ORDER — ROSUVASTATIN CALCIUM 5 MG/1
0 TABLET ORAL
Qty: 0 | Refills: 0 | DISCHARGE

## 2024-03-26 RX ORDER — ALBUTEROL 90 UG/1
1 AEROSOL, METERED ORAL
Qty: 0 | Refills: 0 | DISCHARGE

## 2024-04-03 ENCOUNTER — APPOINTMENT (OUTPATIENT)
Dept: PULMONOLOGY | Facility: CLINIC | Age: 84
End: 2024-04-03

## 2024-05-08 PROBLEM — I25.10 ATHEROSCLEROTIC HEART DISEASE OF NATIVE CORONARY ARTERY WITHOUT ANGINA PECTORIS: Chronic | Status: ACTIVE | Noted: 2018-02-26

## 2024-05-08 PROBLEM — N40.0 BENIGN PROSTATIC HYPERPLASIA WITHOUT LOWER URINARY TRACT SYMPTOMS: Chronic | Status: ACTIVE | Noted: 2018-02-26

## 2024-05-08 PROBLEM — J44.9 CHRONIC OBSTRUCTIVE PULMONARY DISEASE, UNSPECIFIED: Chronic | Status: ACTIVE | Noted: 2018-02-26

## 2024-05-08 PROBLEM — K80.20 CALCULUS OF GALLBLADDER WITHOUT CHOLECYSTITIS WITHOUT OBSTRUCTION: Chronic | Status: ACTIVE | Noted: 2018-02-26

## 2024-05-08 PROBLEM — E78.5 HYPERLIPIDEMIA, UNSPECIFIED: Chronic | Status: ACTIVE | Noted: 2018-02-26

## 2024-05-08 PROBLEM — M19.90 UNSPECIFIED OSTEOARTHRITIS, UNSPECIFIED SITE: Chronic | Status: ACTIVE | Noted: 2018-02-26

## 2024-06-10 ENCOUNTER — APPOINTMENT (OUTPATIENT)
Dept: PULMONOLOGY | Facility: CLINIC | Age: 84
End: 2024-06-10
Payer: MEDICARE

## 2024-06-10 VITALS
OXYGEN SATURATION: 89 % | WEIGHT: 223 LBS | HEART RATE: 64 BPM | DIASTOLIC BLOOD PRESSURE: 60 MMHG | RESPIRATION RATE: 18 BRPM | BODY MASS INDEX: 39.51 KG/M2 | SYSTOLIC BLOOD PRESSURE: 121 MMHG | HEIGHT: 63 IN

## 2024-06-10 PROCEDURE — 99213 OFFICE O/P EST LOW 20 MIN: CPT

## 2024-06-15 NOTE — DISCUSSION/SUMMARY
[FreeTextEntry1] : 84-year-old male with history of SOURAV and mixed abnormality on PFTs at baseline.  The patient is continue PAP treatment as before with supplemental oxygen.  He is to continue nebulized bronchodilators as before.  Treatment adjustment will depend on symptomatic needs.  He is to fly with supplemental oxygen with the appropriate paperwork will be completed.  He is to follow-up with his PMD as before.  His wife was present throughout.

## 2024-06-15 NOTE — PHYSICAL EXAM
[No Acute Distress] : no acute distress [Normal Oropharynx] : normal oropharynx [Normal Appearance] : normal appearance [No Neck Mass] : no neck mass [Normal Rate/Rhythm] : normal rate/rhythm [Normal S1, S2] : normal s1, s2 [No Murmurs] : no murmurs [No Resp Distress] : no resp distress [Clear to Auscultation Bilaterally] : clear to auscultation bilaterally [Wheeze] : wheeze [Rales] : rales [No Abnormalities] : no abnormalities [Benign] : benign [No Clubbing] : no clubbing [No Cyanosis] : no cyanosis [No Edema] : no edema [FROM] : FROM [Normal Color/ Pigmentation] : normal color/ pigmentation [No Focal Deficits] : no focal deficits [Oriented x3] : oriented x3 [Normal Affect] : normal affect [TextBox_2] : Increased BMI [TextBox_68] : Decreased breath sounds bilaterally [TextBox_99] : ambulates with canes

## 2024-06-15 NOTE — CONSULT LETTER
[Dear  ___] : Dear  [unfilled], [Courtesy Letter:] : I had the pleasure of seeing your patient, [unfilled], in my office today. [Please see my note below.] : Please see my note below. [Consult Closing:] : Thank you very much for allowing me to participate in the care of this patient.  If you have any questions, please do not hesitate to contact me. [Sincerely,] : Sincerely, [FreeTextEntry3] : NAYANA BYRNE MD  [address], [address] [place] [address]

## 2024-06-15 NOTE — HISTORY OF PRESENT ILLNESS
[Former] : former [>= 20 pack years] : >= 20 pack years [TextBox_4] : 84-year-old male with history of SOURAV on PAP treatment with mixed abnormality on PFTs and hypoxemia, presents for follow-up.  Patient continues to feel "well" with supplemental oxygen and nebulized bronchodilators.  He complains of FIGUEROA without cough, chest pain, hemoptysis, night sweats or weight loss. [YearQuit] : 2000 [TextBox_29] : Denies snoring, daytime somnolence, apneic episodes, AM headaches

## 2024-07-08 ENCOUNTER — NON-APPOINTMENT (OUTPATIENT)
Age: 84
End: 2024-07-08

## 2024-10-03 NOTE — PROGRESS NOTE ADULT - EXTREMITIES
No cyanosis, clubbing or edema
No cyanosis, clubbing or edema
Xray Chest 1 View- PORTABLE-Urgent
No cyanosis, clubbing or edema

## 2024-12-18 ENCOUNTER — APPOINTMENT (OUTPATIENT)
Dept: PULMONOLOGY | Facility: CLINIC | Age: 84
End: 2024-12-18

## 2024-12-18 VITALS
BODY MASS INDEX: 38.98 KG/M2 | TEMPERATURE: 98.1 F | SYSTOLIC BLOOD PRESSURE: 169 MMHG | WEIGHT: 220 LBS | HEIGHT: 63 IN | DIASTOLIC BLOOD PRESSURE: 83 MMHG | HEART RATE: 70 BPM | OXYGEN SATURATION: 90 % | RESPIRATION RATE: 16 BRPM

## 2024-12-18 PROCEDURE — 99214 OFFICE O/P EST MOD 30 MIN: CPT

## 2025-03-19 ENCOUNTER — APPOINTMENT (OUTPATIENT)
Dept: PULMONOLOGY | Facility: CLINIC | Age: 85
End: 2025-03-19
Payer: MEDICARE

## 2025-03-19 VITALS
BODY MASS INDEX: 38.98 KG/M2 | HEART RATE: 69 BPM | OXYGEN SATURATION: 86 % | RESPIRATION RATE: 20 BRPM | TEMPERATURE: 98.2 F | SYSTOLIC BLOOD PRESSURE: 168 MMHG | DIASTOLIC BLOOD PRESSURE: 88 MMHG | WEIGHT: 220 LBS | HEIGHT: 63 IN

## 2025-03-19 PROCEDURE — 94618 PULMONARY STRESS TESTING: CPT

## 2025-03-19 PROCEDURE — 99213 OFFICE O/P EST LOW 20 MIN: CPT | Mod: 25

## 2025-06-25 ENCOUNTER — APPOINTMENT (OUTPATIENT)
Dept: PULMONOLOGY | Facility: CLINIC | Age: 85
End: 2025-06-25
Payer: MEDICARE

## 2025-06-25 VITALS
TEMPERATURE: 97.9 F | BODY MASS INDEX: 39.51 KG/M2 | OXYGEN SATURATION: 84 % | HEIGHT: 63 IN | WEIGHT: 223 LBS | HEART RATE: 72 BPM | DIASTOLIC BLOOD PRESSURE: 69 MMHG | RESPIRATION RATE: 22 BRPM | SYSTOLIC BLOOD PRESSURE: 126 MMHG

## 2025-06-25 VITALS
SYSTOLIC BLOOD PRESSURE: 126 MMHG | OXYGEN SATURATION: 92 % | WEIGHT: 22 LBS | HEART RATE: 72 BPM | HEIGHT: 63 IN | DIASTOLIC BLOOD PRESSURE: 69 MMHG | BODY MASS INDEX: 3.9 KG/M2 | TEMPERATURE: 97.9 F | RESPIRATION RATE: 22 BRPM

## 2025-06-25 PROCEDURE — 99214 OFFICE O/P EST MOD 30 MIN: CPT

## 2025-07-08 ENCOUNTER — NON-APPOINTMENT (OUTPATIENT)
Age: 85
End: 2025-07-08

## 2025-07-09 NOTE — ED ADULT NURSE NOTE - BOWEL SOUNDS RLQ
Patient Refill Request     Last Office Visit: 12/17/2024 with Dr. Boyer     When they were supposed to follow up: 3 months     Upcoming Office Visit: 07/11/2025 with Dr. Boyer      Refills Requested: Fluticasone-Salmeterol 232 mcg     Type of refill: 30  day     Requested Pharmacy: [facility]      Is prescription pended? Yes   present

## 2025-09-10 ENCOUNTER — APPOINTMENT (OUTPATIENT)
Dept: PULMONOLOGY | Facility: CLINIC | Age: 85
End: 2025-09-10

## 2025-09-10 VITALS
HEART RATE: 76 BPM | HEIGHT: 63 IN | RESPIRATION RATE: 24 BRPM | DIASTOLIC BLOOD PRESSURE: 74 MMHG | OXYGEN SATURATION: 80 % | WEIGHT: 225 LBS | BODY MASS INDEX: 39.87 KG/M2 | SYSTOLIC BLOOD PRESSURE: 149 MMHG | TEMPERATURE: 98.2 F